# Patient Record
Sex: MALE | Race: BLACK OR AFRICAN AMERICAN | NOT HISPANIC OR LATINO
[De-identification: names, ages, dates, MRNs, and addresses within clinical notes are randomized per-mention and may not be internally consistent; named-entity substitution may affect disease eponyms.]

---

## 2018-02-26 PROBLEM — Z00.00 ENCOUNTER FOR PREVENTIVE HEALTH EXAMINATION: Status: ACTIVE | Noted: 2018-02-26

## 2018-03-05 ENCOUNTER — OTHER (OUTPATIENT)
Age: 63
End: 2018-03-05

## 2018-03-05 ENCOUNTER — APPOINTMENT (OUTPATIENT)
Dept: UROLOGY | Facility: CLINIC | Age: 63
End: 2018-03-05
Payer: COMMERCIAL

## 2018-03-05 VITALS
HEART RATE: 69 BPM | RESPIRATION RATE: 16 BRPM | BODY MASS INDEX: 20.89 KG/M2 | HEIGHT: 66 IN | WEIGHT: 130 LBS | DIASTOLIC BLOOD PRESSURE: 81 MMHG | TEMPERATURE: 97.9 F | SYSTOLIC BLOOD PRESSURE: 122 MMHG

## 2018-03-05 DIAGNOSIS — K42.9 UMBILICAL HERNIA W/OUT OBSTRUCTION OR GANGRENE: ICD-10-CM
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
## 2018-03-05 DIAGNOSIS — Z78.9 OTHER SPECIFIED HEALTH STATUS: ICD-10-CM

## 2018-03-05 DIAGNOSIS — E11.9 TYPE 2 DIABETES MELLITUS W/OUT COMPLICATIONS: ICD-10-CM

## 2018-03-05 DIAGNOSIS — Z02.82 ENCOUNTER FOR ADOPTION SERVICES: ICD-10-CM

## 2018-03-05 DIAGNOSIS — K40.90 UNILATERAL INGUINAL HERNIA, W/OUT OBSTRUCTION OR GANGRENE, NOT SPECIFIED AS RECURRENT: ICD-10-CM

## 2018-03-05 DIAGNOSIS — F17.200 NICOTINE DEPENDENCE, UNSPECIFIED, UNCOMPLICATED: ICD-10-CM

## 2018-03-05 PROCEDURE — 99204 OFFICE O/P NEW MOD 45 MIN: CPT

## 2018-03-05 RX ORDER — DAPAGLIFLOZIN AND METFORMIN HYDROCHLORIDE 2.5; 1 MG/1; MG/1
TABLET, FILM COATED, EXTENDED RELEASE ORAL
Refills: 0 | Status: ACTIVE | COMMUNITY

## 2018-03-06 PROBLEM — K42.9 PERIUMBILICAL HERNIA: Status: RESOLVED | Noted: 2018-03-06 | Resolved: 2018-03-06

## 2018-03-29 ENCOUNTER — OTHER (OUTPATIENT)
Age: 63
End: 2018-03-29

## 2018-05-02 ENCOUNTER — OUTPATIENT (OUTPATIENT)
Dept: OUTPATIENT SERVICES | Facility: HOSPITAL | Age: 63
LOS: 1 days | Discharge: HOME | End: 2018-05-02
Payer: COMMERCIAL

## 2018-05-02 VITALS
SYSTOLIC BLOOD PRESSURE: 137 MMHG | RESPIRATION RATE: 20 BRPM | TEMPERATURE: 98 F | OXYGEN SATURATION: 100 % | WEIGHT: 128.09 LBS | DIASTOLIC BLOOD PRESSURE: 88 MMHG | HEART RATE: 67 BPM | HEIGHT: 66 IN

## 2018-05-02 DIAGNOSIS — Z90.81 ACQUIRED ABSENCE OF SPLEEN: Chronic | ICD-10-CM

## 2018-05-02 DIAGNOSIS — C61 MALIGNANT NEOPLASM OF PROSTATE: ICD-10-CM

## 2018-05-02 DIAGNOSIS — Z98.890 OTHER SPECIFIED POSTPROCEDURAL STATES: Chronic | ICD-10-CM

## 2018-05-02 LAB
ABO RH CONFIRMATION: SIGNIFICANT CHANGE UP
ANION GAP SERPL CALC-SCNC: 8 MMOL/L — SIGNIFICANT CHANGE UP (ref 5–17)
ANISOCYTOSIS BLD QL: SLIGHT — SIGNIFICANT CHANGE UP
APPEARANCE UR: CLEAR — SIGNIFICANT CHANGE UP
APTT BLD: 37.1 SEC — SIGNIFICANT CHANGE UP (ref 27.5–37.4)
BACTERIA # UR AUTO: (no result)
BASOPHILS # BLD AUTO: 0.06 K/UL — SIGNIFICANT CHANGE UP (ref 0–0.2)
BASOPHILS NFR BLD AUTO: 1 % — SIGNIFICANT CHANGE UP (ref 0–2)
BILIRUB UR-MCNC: NEGATIVE — SIGNIFICANT CHANGE UP
BIZARRE PLATELETS BLD QL SMEAR: PRESENT — SIGNIFICANT CHANGE UP
BLD GP AB SCN SERPL QL: SIGNIFICANT CHANGE UP
BUN SERPL-MCNC: 22 MG/DL — SIGNIFICANT CHANGE UP (ref 7–23)
BURR CELLS BLD QL SMEAR: PRESENT — SIGNIFICANT CHANGE UP
CALCIUM SERPL-MCNC: 9.5 MG/DL — SIGNIFICANT CHANGE UP (ref 8.5–10.1)
CHLORIDE SERPL-SCNC: 108 MMOL/L — SIGNIFICANT CHANGE UP (ref 96–108)
CO2 SERPL-SCNC: 22 MMOL/L — SIGNIFICANT CHANGE UP (ref 22–31)
COLOR SPEC: YELLOW — SIGNIFICANT CHANGE UP
CREAT SERPL-MCNC: 0.9 MG/DL — SIGNIFICANT CHANGE UP (ref 0.5–1.3)
DIFF PNL FLD: (no result)
EOSINOPHIL # BLD AUTO: 0.23 K/UL — SIGNIFICANT CHANGE UP (ref 0–0.5)
EOSINOPHIL NFR BLD AUTO: 4 % — SIGNIFICANT CHANGE UP (ref 0–6)
EPI CELLS # UR: SIGNIFICANT CHANGE UP
GLUCOSE SERPL-MCNC: 110 MG/DL — HIGH (ref 70–99)
GLUCOSE UR QL: 1000 MG/DL
HCT VFR BLD CALC: 42.4 % — SIGNIFICANT CHANGE UP (ref 39–50)
HGB BLD-MCNC: 13.7 G/DL — SIGNIFICANT CHANGE UP (ref 13–17)
HYPOCHROMIA BLD QL: SLIGHT — SIGNIFICANT CHANGE UP
HYPOGRAN NEUTS BLD QL SMEAR: PRESENT — SIGNIFICANT CHANGE UP
INR BLD: 1.05 RATIO — SIGNIFICANT CHANGE UP (ref 0.88–1.16)
KETONES UR-MCNC: NEGATIVE — SIGNIFICANT CHANGE UP
LEUKOCYTE ESTERASE UR-ACNC: NEGATIVE — SIGNIFICANT CHANGE UP
LG PLATELETS BLD QL AUTO: SLIGHT — SIGNIFICANT CHANGE UP
LYMPHOCYTES # BLD AUTO: 2.11 K/UL — SIGNIFICANT CHANGE UP (ref 1–3.3)
LYMPHOCYTES # BLD AUTO: 37 % — SIGNIFICANT CHANGE UP (ref 13–44)
LYMPHOCYTES # SPEC AUTO: 1 % — HIGH (ref 0–0)
MANUAL SMEAR VERIFICATION: SIGNIFICANT CHANGE UP
MCHC RBC-ENTMCNC: 25.6 PG — LOW (ref 27–34)
MCHC RBC-ENTMCNC: 32.3 GM/DL — SIGNIFICANT CHANGE UP (ref 32–36)
MCV RBC AUTO: 79.1 FL — LOW (ref 80–100)
MONOCYTES # BLD AUTO: 0.57 K/UL — SIGNIFICANT CHANGE UP (ref 0–0.9)
MONOCYTES NFR BLD AUTO: 10 % — SIGNIFICANT CHANGE UP (ref 2–14)
NEUTROPHILS # BLD AUTO: 2.67 K/UL — SIGNIFICANT CHANGE UP (ref 1.8–7.4)
NEUTROPHILS NFR BLD AUTO: 47 % — SIGNIFICANT CHANGE UP (ref 43–77)
NITRITE UR-MCNC: NEGATIVE — SIGNIFICANT CHANGE UP
NRBC # BLD: 0 /100 — SIGNIFICANT CHANGE UP (ref 0–0)
NRBC # BLD: SIGNIFICANT CHANGE UP /100 WBCS (ref 0–0)
OVALOCYTES BLD QL SMEAR: SLIGHT — SIGNIFICANT CHANGE UP
PH UR: 7 — SIGNIFICANT CHANGE UP (ref 5–8)
PLAT MORPH BLD: NORMAL — SIGNIFICANT CHANGE UP
PLATELET # BLD AUTO: 226 K/UL — SIGNIFICANT CHANGE UP (ref 150–400)
PLATELET COUNT - ESTIMATE: NORMAL — SIGNIFICANT CHANGE UP
POIKILOCYTOSIS BLD QL AUTO: SIGNIFICANT CHANGE UP
POTASSIUM SERPL-MCNC: 4.3 MMOL/L — SIGNIFICANT CHANGE UP (ref 3.5–5.3)
POTASSIUM SERPL-SCNC: 4.3 MMOL/L — SIGNIFICANT CHANGE UP (ref 3.5–5.3)
PROT UR-MCNC: 15 MG/DL
PROTHROM AB SERPL-ACNC: 11.3 SEC — SIGNIFICANT CHANGE UP (ref 9.8–12.7)
RBC # BLD: 5.36 M/UL — SIGNIFICANT CHANGE UP (ref 4.2–5.8)
RBC # FLD: 21.8 % — HIGH (ref 10.3–14.5)
RBC BLD AUTO: (no result)
RBC CASTS # UR COMP ASSIST: (no result) /HPF (ref 0–4)
SCHISTOCYTES BLD QL AUTO: SLIGHT — SIGNIFICANT CHANGE UP
SODIUM SERPL-SCNC: 138 MMOL/L — SIGNIFICANT CHANGE UP (ref 135–145)
SP GR SPEC: 1.01 — SIGNIFICANT CHANGE UP (ref 1.01–1.02)
TARGETS BLD QL SMEAR: SLIGHT — SIGNIFICANT CHANGE UP
TYPE + AB SCN PNL BLD: SIGNIFICANT CHANGE UP
UROBILINOGEN FLD QL: NEGATIVE MG/DL — SIGNIFICANT CHANGE UP
WBC # BLD: 5.69 K/UL — SIGNIFICANT CHANGE UP (ref 3.8–10.5)
WBC # FLD AUTO: 5.69 K/UL — SIGNIFICANT CHANGE UP (ref 3.8–10.5)
WBC UR QL: SIGNIFICANT CHANGE UP

## 2018-05-02 PROCEDURE — 71046 X-RAY EXAM CHEST 2 VIEWS: CPT | Mod: 26

## 2018-05-02 PROCEDURE — 93010 ELECTROCARDIOGRAM REPORT: CPT

## 2018-05-02 NOTE — H&P PST ADULT - ASSESSMENT
64 y/o male with prostate cancer. Complain of urinary frequency and intermittent hematuria. Scheduled for Robotic radical prostatectomy with Dr. Victoria.    Plan  1. Stop all NSAIDS, herbal supplements and vitamins for 7 days.  2. NPO at midnight.  3. Use EZ sponges as directed
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
## 2018-05-02 NOTE — H&P PST ADULT - NSANTHOSAYNRD_GEN_A_CORE
No. KRYSTIN screening performed.  STOP BANG Legend: 0-2 = LOW Risk; 3-4 = INTERMEDIATE Risk; 5-8 = HIGH Risk

## 2018-05-08 RX ORDER — OXYCODONE HYDROCHLORIDE 5 MG/1
10 TABLET ORAL ONCE
Qty: 0 | Refills: 0 | Status: DISCONTINUED | OUTPATIENT
Start: 2018-05-09 | End: 2018-05-09

## 2018-05-08 RX ORDER — OXYCODONE HYDROCHLORIDE 5 MG/1
5 TABLET ORAL EVERY 4 HOURS
Qty: 0 | Refills: 0 | Status: DISCONTINUED | OUTPATIENT
Start: 2018-05-09 | End: 2018-05-09

## 2018-05-08 RX ORDER — FAMOTIDINE 10 MG/ML
20 INJECTION INTRAVENOUS ONCE
Qty: 0 | Refills: 0 | Status: COMPLETED | OUTPATIENT
Start: 2018-05-09 | End: 2018-05-09

## 2018-05-08 RX ORDER — ACETAMINOPHEN 500 MG
975 TABLET ORAL ONCE
Qty: 0 | Refills: 0 | Status: COMPLETED | OUTPATIENT
Start: 2018-05-09 | End: 2018-05-09

## 2018-05-08 RX ORDER — FENTANYL CITRATE 50 UG/ML
50 INJECTION INTRAVENOUS
Qty: 0 | Refills: 0 | Status: DISCONTINUED | OUTPATIENT
Start: 2018-05-09 | End: 2018-05-09

## 2018-05-09 ENCOUNTER — RESULT REVIEW (OUTPATIENT)
Age: 63
End: 2018-05-09

## 2018-05-09 ENCOUNTER — INPATIENT (INPATIENT)
Facility: HOSPITAL | Age: 63
LOS: 6 days | Discharge: HOME | End: 2018-05-16
Attending: UROLOGY | Admitting: UROLOGY
Payer: COMMERCIAL

## 2018-05-09 ENCOUNTER — APPOINTMENT (OUTPATIENT)
Dept: UROLOGY | Facility: HOSPITAL | Age: 63
End: 2018-05-09
Payer: COMMERCIAL

## 2018-05-09 VITALS
RESPIRATION RATE: 16 BRPM | HEART RATE: 62 BPM | OXYGEN SATURATION: 98 % | SYSTOLIC BLOOD PRESSURE: 132 MMHG | DIASTOLIC BLOOD PRESSURE: 92 MMHG | WEIGHT: 130.07 LBS | HEIGHT: 66 IN | TEMPERATURE: 98 F

## 2018-05-09 DIAGNOSIS — Z98.890 OTHER SPECIFIED POSTPROCEDURAL STATES: Chronic | ICD-10-CM

## 2018-05-09 DIAGNOSIS — Z90.81 ACQUIRED ABSENCE OF SPLEEN: Chronic | ICD-10-CM

## 2018-05-09 LAB
ANION GAP SERPL CALC-SCNC: 10 MMOL/L — SIGNIFICANT CHANGE UP (ref 5–17)
BLD GP AB SCN SERPL QL: SIGNIFICANT CHANGE UP
BUN SERPL-MCNC: 18 MG/DL — SIGNIFICANT CHANGE UP (ref 7–23)
CALCIUM SERPL-MCNC: 8.1 MG/DL — LOW (ref 8.5–10.1)
CHLORIDE SERPL-SCNC: 108 MMOL/L — SIGNIFICANT CHANGE UP (ref 96–108)
CO2 SERPL-SCNC: 22 MMOL/L — SIGNIFICANT CHANGE UP (ref 22–31)
CREAT SERPL-MCNC: 1.01 MG/DL — SIGNIFICANT CHANGE UP (ref 0.5–1.3)
GLUCOSE BLDC GLUCOMTR-MCNC: 108 MG/DL — HIGH (ref 70–99)
GLUCOSE BLDC GLUCOMTR-MCNC: 121 MG/DL — HIGH (ref 70–99)
GLUCOSE BLDC GLUCOMTR-MCNC: 94 MG/DL — SIGNIFICANT CHANGE UP (ref 70–99)
GLUCOSE SERPL-MCNC: 166 MG/DL — HIGH (ref 70–99)
HCT VFR BLD CALC: 43.7 % — SIGNIFICANT CHANGE UP (ref 39–50)
HGB BLD-MCNC: 14.1 G/DL — SIGNIFICANT CHANGE UP (ref 13–17)
MCHC RBC-ENTMCNC: 26 PG — LOW (ref 27–34)
MCHC RBC-ENTMCNC: 32.3 GM/DL — SIGNIFICANT CHANGE UP (ref 32–36)
MCV RBC AUTO: 80.6 FL — SIGNIFICANT CHANGE UP (ref 80–100)
NRBC # BLD: 0 /100 WBCS — SIGNIFICANT CHANGE UP (ref 0–0)
PLATELET # BLD AUTO: 205 K/UL — SIGNIFICANT CHANGE UP (ref 150–400)
POTASSIUM SERPL-MCNC: 3.8 MMOL/L — SIGNIFICANT CHANGE UP (ref 3.5–5.3)
POTASSIUM SERPL-SCNC: 3.8 MMOL/L — SIGNIFICANT CHANGE UP (ref 3.5–5.3)
RBC # BLD: 5.42 M/UL — SIGNIFICANT CHANGE UP (ref 4.2–5.8)
RBC # FLD: 20.8 % — HIGH (ref 10.3–14.5)
SODIUM SERPL-SCNC: 140 MMOL/L — SIGNIFICANT CHANGE UP (ref 135–145)
TYPE + AB SCN PNL BLD: SIGNIFICANT CHANGE UP
WBC # BLD: 14.52 K/UL — HIGH (ref 3.8–10.5)
WBC # FLD AUTO: 14.52 K/UL — HIGH (ref 3.8–10.5)

## 2018-05-09 PROCEDURE — 88300 SURGICAL PATH GROSS: CPT | Mod: 26,59

## 2018-05-09 PROCEDURE — 88307 TISSUE EXAM BY PATHOLOGIST: CPT | Mod: 26

## 2018-05-09 PROCEDURE — 55866 LAPS SURG PRST8ECT RPBIC RAD: CPT

## 2018-05-09 PROCEDURE — 38571 LAPAROSCOPY LYMPHADENECTOMY: CPT

## 2018-05-09 PROCEDURE — 88309 TISSUE EXAM BY PATHOLOGIST: CPT | Mod: 26

## 2018-05-09 RX ORDER — INSULIN LISPRO 100/ML
VIAL (ML) SUBCUTANEOUS
Qty: 0 | Refills: 0 | Status: DISCONTINUED | OUTPATIENT
Start: 2018-05-09 | End: 2018-05-16

## 2018-05-09 RX ORDER — DEXTROSE 50 % IN WATER 50 %
25 SYRINGE (ML) INTRAVENOUS ONCE
Qty: 0 | Refills: 0 | Status: DISCONTINUED | OUTPATIENT
Start: 2018-05-09 | End: 2018-05-16

## 2018-05-09 RX ORDER — HEPARIN SODIUM 5000 [USP'U]/ML
5000 INJECTION INTRAVENOUS; SUBCUTANEOUS EVERY 8 HOURS
Qty: 0 | Refills: 0 | Status: DISCONTINUED | OUTPATIENT
Start: 2018-05-09 | End: 2018-05-11

## 2018-05-09 RX ORDER — FERROUS GLUCONATE 100 %
1 POWDER (GRAM) MISCELLANEOUS
Qty: 0 | Refills: 0 | COMMUNITY

## 2018-05-09 RX ORDER — DOCUSATE SODIUM 100 MG
100 CAPSULE ORAL THREE TIMES A DAY
Qty: 0 | Refills: 0 | Status: DISCONTINUED | OUTPATIENT
Start: 2018-05-09 | End: 2018-05-16

## 2018-05-09 RX ORDER — SODIUM CHLORIDE 9 MG/ML
1000 INJECTION, SOLUTION INTRAVENOUS
Qty: 0 | Refills: 0 | Status: DISCONTINUED | OUTPATIENT
Start: 2018-05-09 | End: 2018-05-11

## 2018-05-09 RX ORDER — OXYBUTYNIN CHLORIDE 5 MG
5 TABLET ORAL ONCE
Qty: 0 | Refills: 0 | Status: COMPLETED | OUTPATIENT
Start: 2018-05-09 | End: 2018-05-09

## 2018-05-09 RX ORDER — DEXTROSE 50 % IN WATER 50 %
15 SYRINGE (ML) INTRAVENOUS ONCE
Qty: 0 | Refills: 0 | Status: DISCONTINUED | OUTPATIENT
Start: 2018-05-09 | End: 2018-05-16

## 2018-05-09 RX ORDER — MORPHINE SULFATE 50 MG/1
2 CAPSULE, EXTENDED RELEASE ORAL
Qty: 0 | Refills: 0 | Status: DISCONTINUED | OUTPATIENT
Start: 2018-05-09 | End: 2018-05-11

## 2018-05-09 RX ORDER — ONDANSETRON 8 MG/1
4 TABLET, FILM COATED ORAL EVERY 6 HOURS
Qty: 0 | Refills: 0 | Status: DISCONTINUED | OUTPATIENT
Start: 2018-05-09 | End: 2018-05-16

## 2018-05-09 RX ORDER — SENNA PLUS 8.6 MG/1
2 TABLET ORAL AT BEDTIME
Qty: 0 | Refills: 0 | Status: DISCONTINUED | OUTPATIENT
Start: 2018-05-09 | End: 2018-05-16

## 2018-05-09 RX ORDER — ONDANSETRON 8 MG/1
4 TABLET, FILM COATED ORAL EVERY 6 HOURS
Qty: 0 | Refills: 0 | Status: DISCONTINUED | OUTPATIENT
Start: 2018-05-09 | End: 2018-05-09

## 2018-05-09 RX ORDER — GLUCAGON INJECTION, SOLUTION 0.5 MG/.1ML
1 INJECTION, SOLUTION SUBCUTANEOUS ONCE
Qty: 0 | Refills: 0 | Status: DISCONTINUED | OUTPATIENT
Start: 2018-05-09 | End: 2018-05-16

## 2018-05-09 RX ORDER — DEXTROSE 50 % IN WATER 50 %
12.5 SYRINGE (ML) INTRAVENOUS ONCE
Qty: 0 | Refills: 0 | Status: DISCONTINUED | OUTPATIENT
Start: 2018-05-09 | End: 2018-05-16

## 2018-05-09 RX ORDER — CEFAZOLIN SODIUM 1 G
2000 VIAL (EA) INJECTION EVERY 8 HOURS
Qty: 0 | Refills: 0 | Status: COMPLETED | OUTPATIENT
Start: 2018-05-09 | End: 2018-05-09

## 2018-05-09 RX ORDER — SODIUM CHLORIDE 9 MG/ML
1000 INJECTION, SOLUTION INTRAVENOUS
Qty: 0 | Refills: 0 | Status: DISCONTINUED | OUTPATIENT
Start: 2018-05-09 | End: 2018-05-09

## 2018-05-09 RX ORDER — SODIUM CHLORIDE 9 MG/ML
1000 INJECTION, SOLUTION INTRAVENOUS
Qty: 0 | Refills: 0 | Status: DISCONTINUED | OUTPATIENT
Start: 2018-05-09 | End: 2018-05-16

## 2018-05-09 RX ORDER — OXYCODONE AND ACETAMINOPHEN 5; 325 MG/1; MG/1
2 TABLET ORAL EVERY 6 HOURS
Qty: 0 | Refills: 0 | Status: DISCONTINUED | OUTPATIENT
Start: 2018-05-09 | End: 2018-05-11

## 2018-05-09 RX ORDER — DAPAGLIFLOZIN AND METFORMIN HYDROCHLORIDE 10; 1000 MG/1; MG/1
1 TABLET, FILM COATED, EXTENDED RELEASE ORAL
Qty: 0 | Refills: 0 | COMMUNITY

## 2018-05-09 RX ADMIN — FENTANYL CITRATE 50 MICROGRAM(S): 50 INJECTION INTRAVENOUS at 12:41

## 2018-05-09 RX ADMIN — FENTANYL CITRATE 50 MICROGRAM(S): 50 INJECTION INTRAVENOUS at 12:24

## 2018-05-09 RX ADMIN — Medication 975 MILLIGRAM(S): at 06:49

## 2018-05-09 RX ADMIN — OXYCODONE HYDROCHLORIDE 10 MILLIGRAM(S): 5 TABLET ORAL at 06:50

## 2018-05-09 RX ADMIN — FENTANYL CITRATE 50 MICROGRAM(S): 50 INJECTION INTRAVENOUS at 13:22

## 2018-05-09 RX ADMIN — SODIUM CHLORIDE 125 MILLILITER(S): 9 INJECTION, SOLUTION INTRAVENOUS at 22:34

## 2018-05-09 RX ADMIN — Medication 100 MILLIGRAM(S): at 23:09

## 2018-05-09 RX ADMIN — FENTANYL CITRATE 50 MICROGRAM(S): 50 INJECTION INTRAVENOUS at 12:07

## 2018-05-09 RX ADMIN — FAMOTIDINE 20 MILLIGRAM(S): 10 INJECTION INTRAVENOUS at 06:50

## 2018-05-09 RX ADMIN — FENTANYL CITRATE 50 MICROGRAM(S): 50 INJECTION INTRAVENOUS at 12:40

## 2018-05-09 RX ADMIN — FENTANYL CITRATE 50 MICROGRAM(S): 50 INJECTION INTRAVENOUS at 12:34

## 2018-05-09 RX ADMIN — Medication 100 MILLIGRAM(S): at 22:17

## 2018-05-09 RX ADMIN — Medication 100 MILLIGRAM(S): at 15:41

## 2018-05-09 RX ADMIN — OXYCODONE HYDROCHLORIDE 5 MILLIGRAM(S): 5 TABLET ORAL at 12:33

## 2018-05-09 RX ADMIN — Medication 5 MILLIGRAM(S): at 16:01

## 2018-05-09 RX ADMIN — SENNA PLUS 2 TABLET(S): 8.6 TABLET ORAL at 22:17

## 2018-05-09 RX ADMIN — OXYCODONE AND ACETAMINOPHEN 2 TABLET(S): 5; 325 TABLET ORAL at 15:58

## 2018-05-09 RX ADMIN — OXYCODONE HYDROCHLORIDE 5 MILLIGRAM(S): 5 TABLET ORAL at 12:41

## 2018-05-09 RX ADMIN — HEPARIN SODIUM 5000 UNIT(S): 5000 INJECTION INTRAVENOUS; SUBCUTANEOUS at 22:18

## 2018-05-09 RX ADMIN — Medication 975 MILLIGRAM(S): at 06:50

## 2018-05-09 NOTE — PROGRESS NOTE ADULT - ASSESSMENT
A/P:  62 yo male s/p robotic prostatectomy with some bladder spasms post-op.   -Ditropan x1 dose for bladder spasms  -Monitor U/O and RENATE drain OP  -Heparin/SCDs for dvt ppx  -Abx- Ancef x 2 doses post-op  -Follow up AM labs  -Clears, likely ADAT in AM.
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
## 2018-05-09 NOTE — BRIEF OPERATIVE NOTE - PROCEDURE
<<-----Click on this checkbox to enter Procedure Prostatectomy, radical, laparoscopic, robot-assisted  05/09/2018    Active  [id number]

## 2018-05-09 NOTE — PATIENT PROFILE ADULT. - TEACHING/LEARNING DEVELOPMENTAL CONSIDERATIONS
Patient needs things explained thoroughly. Not understanding some basic language./developmental considerations

## 2018-05-09 NOTE — PROGRESS NOTE ADULT - SUBJECTIVE AND OBJECTIVE BOX
Pt complaining of bladder spasms.  Tolerating some water.  No n/v.      Vital Signs Last 24 Hrs-  T(F): 97.6    HR: 74  BP: 124/93   RR: 18   SpO2: 100%     PHYSICAL EXAM:    Gen- comfortable  CV- RRR  Chest- CTA bilat  Abd- soft NTND, incisions intact with dermabond, hypoactive BS  Ext- no edema  RENATE-100 cc serosang/4 hours since OR  Mayen-340 pink tinged urine/4 hours since OR

## 2018-05-10 LAB
ANION GAP SERPL CALC-SCNC: 8 MMOL/L — SIGNIFICANT CHANGE UP (ref 5–17)
BASOPHILS # BLD AUTO: 0.02 K/UL — SIGNIFICANT CHANGE UP (ref 0–0.2)
BASOPHILS NFR BLD AUTO: 0.2 % — SIGNIFICANT CHANGE UP (ref 0–2)
BUN SERPL-MCNC: 10 MG/DL — SIGNIFICANT CHANGE UP (ref 7–23)
CALCIUM SERPL-MCNC: 8.5 MG/DL — SIGNIFICANT CHANGE UP (ref 8.5–10.1)
CHLORIDE SERPL-SCNC: 106 MMOL/L — SIGNIFICANT CHANGE UP (ref 96–108)
CO2 SERPL-SCNC: 26 MMOL/L — SIGNIFICANT CHANGE UP (ref 22–31)
CREAT SERPL-MCNC: 0.92 MG/DL — SIGNIFICANT CHANGE UP (ref 0.5–1.3)
EOSINOPHIL # BLD AUTO: 0.01 K/UL — SIGNIFICANT CHANGE UP (ref 0–0.5)
EOSINOPHIL NFR BLD AUTO: 0.1 % — SIGNIFICANT CHANGE UP (ref 0–6)
GLUCOSE BLDC GLUCOMTR-MCNC: 103 MG/DL — HIGH (ref 70–99)
GLUCOSE BLDC GLUCOMTR-MCNC: 90 MG/DL — SIGNIFICANT CHANGE UP (ref 70–99)
GLUCOSE BLDC GLUCOMTR-MCNC: 94 MG/DL — SIGNIFICANT CHANGE UP (ref 70–99)
GLUCOSE SERPL-MCNC: 97 MG/DL — SIGNIFICANT CHANGE UP (ref 70–99)
HBA1C BLD-MCNC: 6.1 % — HIGH (ref 4–5.6)
HCT VFR BLD CALC: 38.9 % — LOW (ref 39–50)
HCT VFR BLD CALC: 40.4 % — SIGNIFICANT CHANGE UP (ref 39–50)
HCT VFR BLD CALC: 42 % — SIGNIFICANT CHANGE UP (ref 39–50)
HGB BLD-MCNC: 12.6 G/DL — LOW (ref 13–17)
HGB BLD-MCNC: 13 G/DL — SIGNIFICANT CHANGE UP (ref 13–17)
HGB BLD-MCNC: 13.4 G/DL — SIGNIFICANT CHANGE UP (ref 13–17)
IMM GRANULOCYTES NFR BLD AUTO: 0.2 % — SIGNIFICANT CHANGE UP (ref 0–1.5)
LYMPHOCYTES # BLD AUTO: 1.58 K/UL — SIGNIFICANT CHANGE UP (ref 1–3.3)
LYMPHOCYTES # BLD AUTO: 16.5 % — SIGNIFICANT CHANGE UP (ref 13–44)
MCHC RBC-ENTMCNC: 25.3 PG — LOW (ref 27–34)
MCHC RBC-ENTMCNC: 25.8 PG — LOW (ref 27–34)
MCHC RBC-ENTMCNC: 32.2 GM/DL — SIGNIFICANT CHANGE UP (ref 32–36)
MCHC RBC-ENTMCNC: 32.4 GM/DL — SIGNIFICANT CHANGE UP (ref 32–36)
MCV RBC AUTO: 78.8 FL — LOW (ref 80–100)
MCV RBC AUTO: 79.7 FL — LOW (ref 80–100)
MONOCYTES # BLD AUTO: 1.3 K/UL — HIGH (ref 0–0.9)
MONOCYTES NFR BLD AUTO: 13.6 % — SIGNIFICANT CHANGE UP (ref 2–14)
NEUTROPHILS # BLD AUTO: 6.62 K/UL — SIGNIFICANT CHANGE UP (ref 1.8–7.4)
NEUTROPHILS NFR BLD AUTO: 69.4 % — SIGNIFICANT CHANGE UP (ref 43–77)
NRBC # BLD: 0 /100 WBCS — SIGNIFICANT CHANGE UP (ref 0–0)
NRBC # BLD: 0 /100 WBCS — SIGNIFICANT CHANGE UP (ref 0–0)
PLATELET # BLD AUTO: 179 K/UL — SIGNIFICANT CHANGE UP (ref 150–400)
PLATELET # BLD AUTO: 203 K/UL — SIGNIFICANT CHANGE UP (ref 150–400)
POTASSIUM SERPL-MCNC: 3.8 MMOL/L — SIGNIFICANT CHANGE UP (ref 3.5–5.3)
POTASSIUM SERPL-SCNC: 3.8 MMOL/L — SIGNIFICANT CHANGE UP (ref 3.5–5.3)
RBC # BLD: 4.88 M/UL — SIGNIFICANT CHANGE UP (ref 4.2–5.8)
RBC # BLD: 5.13 M/UL — SIGNIFICANT CHANGE UP (ref 4.2–5.8)
RBC # FLD: 20.5 % — HIGH (ref 10.3–14.5)
RBC # FLD: 21 % — HIGH (ref 10.3–14.5)
SODIUM SERPL-SCNC: 140 MMOL/L — SIGNIFICANT CHANGE UP (ref 135–145)
WBC # BLD: 10.05 K/UL — SIGNIFICANT CHANGE UP (ref 3.8–10.5)
WBC # BLD: 9.55 K/UL — SIGNIFICANT CHANGE UP (ref 3.8–10.5)
WBC # FLD AUTO: 10.05 K/UL — SIGNIFICANT CHANGE UP (ref 3.8–10.5)
WBC # FLD AUTO: 9.55 K/UL — SIGNIFICANT CHANGE UP (ref 3.8–10.5)

## 2018-05-10 RX ORDER — ACETAMINOPHEN 500 MG
650 TABLET ORAL EVERY 6 HOURS
Qty: 0 | Refills: 0 | Status: DISCONTINUED | OUTPATIENT
Start: 2018-05-10 | End: 2018-05-16

## 2018-05-10 RX ADMIN — OXYCODONE AND ACETAMINOPHEN 2 TABLET(S): 5; 325 TABLET ORAL at 13:05

## 2018-05-10 RX ADMIN — Medication 100 MILLIGRAM(S): at 22:31

## 2018-05-10 RX ADMIN — SENNA PLUS 2 TABLET(S): 8.6 TABLET ORAL at 22:31

## 2018-05-10 RX ADMIN — HEPARIN SODIUM 5000 UNIT(S): 5000 INJECTION INTRAVENOUS; SUBCUTANEOUS at 05:21

## 2018-05-10 RX ADMIN — OXYCODONE AND ACETAMINOPHEN 2 TABLET(S): 5; 325 TABLET ORAL at 19:54

## 2018-05-10 RX ADMIN — Medication 1 TABLET(S): at 12:40

## 2018-05-10 RX ADMIN — Medication 100 MILLIGRAM(S): at 05:21

## 2018-05-10 RX ADMIN — Medication 100 MILLIGRAM(S): at 15:09

## 2018-05-10 RX ADMIN — OXYCODONE AND ACETAMINOPHEN 2 TABLET(S): 5; 325 TABLET ORAL at 12:07

## 2018-05-10 RX ADMIN — OXYCODONE AND ACETAMINOPHEN 2 TABLET(S): 5; 325 TABLET ORAL at 05:19

## 2018-05-10 NOTE — PROGRESS NOTE ADULT - SUBJECTIVE AND OBJECTIVE BOX
Asked to evaluate patient's RENATE drain site due to dressing being saturated.  Patient sitting in chair, offers no complaints at this time.      BP- 118/78, HR- 98    Abdomen: Non-distended, wounds C/D/I with Dermabond in place.  RENATE drain dressing saturated.  Dressing removed.  + slow rate of bleeding noted from lateral aspect of RENATE drain site.  Pressure held x 5 minutes-- bleeding slowed a bit but hemostasis not achieved.  RENATE bulb with sanguinous output.  Abdomen soft, minimally tender to lower aspect.  No rebound, no guarding.  Surgicel placed around RENATE site and pressure dressing placed.    RENATE output= 500ml x last shift    A/P: POD # 1 s/p Robotic Prostatectomy with post operative bleeding    -- D/W Dr. Victoria  -- STAT CBC

## 2018-05-10 NOTE — PROGRESS NOTE ADULT - SUBJECTIVE AND OBJECTIVE BOX
S/P Robotic Prostatectomy POD#1    Pt RLQ RENATE drain site bandage saturated 3X today. RENATE output last 2 hrs 90cc SS recorded. Advised nurse to hold Heparin SQ 2PM dose for high RENATE output. F/U H/H 4PM and Re-eval RENATE output more freq.

## 2018-05-10 NOTE — PROGRESS NOTE ADULT - ASSESSMENT
64 y/o M s/p Robotic Prostatectomy POD#1  - Adv Diet  - OOB/Ambulate as desiree  -f/u RENATE OP  - Changed Dressing  -Re-eval later today for d/c planning  Dr. Victoria Aware

## 2018-05-10 NOTE — PROGRESS NOTE ADULT - SUBJECTIVE AND OBJECTIVE BOX
Status Post:      Post Operative Day #: 1    SUBJECTIVE: Pt seen and examined at bedside. No complaints. No events overnight. Tolerating Clears.     Flatus: Y             Bowel Movement: N  Pain (0-10): 3          Pain Control Adequate: Y  Nausea: N          Vomiting: N  Diarrhea/Constipation?  N  Chest Pain: N   SOB:  N    MEDICATIONS  (STANDING):  calcium carbonate 1250 mG + Vitamin D (OsCal 500 + D) 1 Tablet(s) Oral daily  dextrose 5%. 1000 milliLiter(s) (50 mL/Hr) IV Continuous <Continuous>  dextrose 50% Injectable 12.5 Gram(s) IV Push once  dextrose 50% Injectable 25 Gram(s) IV Push once  dextrose 50% Injectable 25 Gram(s) IV Push once  docusate sodium 100 milliGRAM(s) Oral three times a day  heparin  Injectable 5000 Unit(s) SubCutaneous every 8 hours  insulin lispro (HumaLOG) corrective regimen sliding scale   SubCutaneous three times a day before meals  lactated ringers. 1000 milliLiter(s) (125 mL/Hr) IV Continuous <Continuous>  senna 2 Tablet(s) Oral at bedtime    MEDICATIONS  (PRN):  dextrose 40% Gel 15 Gram(s) Oral once PRN Blood Glucose LESS THAN 70 milliGRAM(s)/deciliter  glucagon  Injectable 1 milliGRAM(s) IntraMuscular once PRN Glucose LESS THAN 70 milligrams/deciliter  morphine  - Injectable 2 milliGRAM(s) IV Push every 3 hours PRN Severe Pain (7 - 10)  ondansetron Injectable 4 milliGRAM(s) IV Push every 6 hours PRN Nausea and/or Vomiting  oxyCODONE    5 mG/acetaminophen 325 mG 2 Tablet(s) Oral every 6 hours PRN Severe Pain      OBJECTIVE:  Vital Signs Last 24 Hrs  T(C): 37.6 (10 May 2018 04:59), Max: 37.6 (10 May 2018 04:59)  T(F): 99.6 (10 May 2018 04:59), Max: 99.6 (10 May 2018 04:59)  HR: 83 (10 May 2018 04:59) (74 - 98)  BP: 120/83 (10 May 2018 04:59) (101/69 - 125/85)  BP(mean): --  RR: 18 (10 May 2018 04:59) (12 - 24)  SpO2: 97% (10 May 2018 04:59) (96% - 100%)  I&O's Detail    09 May 2018 07:01  -  10 May 2018 07:00  --------------------------------------------------------  IN:    IV PiggyBack: 50 mL    lactated ringers.: 1415 mL    Other: 2000 mL  Total IN: 3465 mL    OUT:    Bulb: 285 mL    Indwelling Catheter - Urethral: 1525 mL    Other: 325 mL  Total OUT: 2135 mL    Total NET: 1330 mL      10 May 2018 07:01  -  10 May 2018 09:15  --------------------------------------------------------  IN:  Total IN: 0 mL    OUT:    Bulb: 100 mL  Total OUT: 100 mL    Total NET: -100 mL    Labs:  CBC Full  -  ( 10 May 2018 05:25 )  WBC Count : 9.55 K/uL  Hemoglobin : 13.0 g/dL  Hematocrit : 40.4 %  Platelet Count - Automated : 203 K/uL    CBC Full  -  ( 09 May 2018 12:21 )  WBC Count : 14.52 K/uL  Hemoglobin : 14.1 g/dL  Hematocrit : 43.7 %  Platelet Count - Automated : 205 K/uL    10 May 2018 05:25    140    |  106    |  10     ----------------------------<  97     3.8     |  26     |  0.92   09 May 2018 12:21    140    |  108    |  18     ----------------------------<  166    3.8     |  22     |  1.01     Ca    8.5        10 May 2018 05:25  Ca    8.1        09 May 2018 12:21        PHYSICAL EXAM:   Chest: CTABL  CV: S1, S2 Present  Abd: Soft, mild tender, ND, +BS, Incision Site C/D/I with derma bond. RENATE site soiled.   : Aguiar in place  Musculoskeletal: No Edema

## 2018-05-10 NOTE — PROGRESS NOTE ADULT - SUBJECTIVE AND OBJECTIVE BOX
POD #1 from RALP  feels well  AVSS  increased Renate output   last shift since this  cc  HCT this am was 40.4  abd: mild distension, NT +BS  ext 0C/C/E  plan:   Observe HCT repeat  the RENATE drain site is oozing and we resutured it.

## 2018-05-11 LAB
ANION GAP SERPL CALC-SCNC: 7 MMOL/L — SIGNIFICANT CHANGE UP (ref 5–17)
BUN SERPL-MCNC: 10 MG/DL — SIGNIFICANT CHANGE UP (ref 7–23)
CALCIUM SERPL-MCNC: 8.5 MG/DL — SIGNIFICANT CHANGE UP (ref 8.5–10.1)
CHLORIDE SERPL-SCNC: 105 MMOL/L — SIGNIFICANT CHANGE UP (ref 96–108)
CO2 SERPL-SCNC: 27 MMOL/L — SIGNIFICANT CHANGE UP (ref 22–31)
CREAT SERPL-MCNC: 0.82 MG/DL — SIGNIFICANT CHANGE UP (ref 0.5–1.3)
GLUCOSE BLDC GLUCOMTR-MCNC: 105 MG/DL — HIGH (ref 70–99)
GLUCOSE BLDC GLUCOMTR-MCNC: 118 MG/DL — HIGH (ref 70–99)
GLUCOSE BLDC GLUCOMTR-MCNC: 147 MG/DL — HIGH (ref 70–99)
GLUCOSE BLDC GLUCOMTR-MCNC: 171 MG/DL — HIGH (ref 70–99)
GLUCOSE BLDC GLUCOMTR-MCNC: 99 MG/DL — SIGNIFICANT CHANGE UP (ref 70–99)
GLUCOSE SERPL-MCNC: 162 MG/DL — HIGH (ref 70–99)
HCT VFR BLD CALC: 32.6 % — LOW (ref 39–50)
HCT VFR BLD CALC: 36 % — LOW (ref 39–50)
HCT VFR BLD CALC: 36.1 % — LOW (ref 39–50)
HGB BLD-MCNC: 10.7 G/DL — LOW (ref 13–17)
HGB BLD-MCNC: 11.6 G/DL — LOW (ref 13–17)
HGB BLD-MCNC: 11.6 G/DL — LOW (ref 13–17)
MCHC RBC-ENTMCNC: 25.4 PG — LOW (ref 27–34)
MCHC RBC-ENTMCNC: 26.3 PG — LOW (ref 27–34)
MCHC RBC-ENTMCNC: 32.1 GM/DL — SIGNIFICANT CHANGE UP (ref 32–36)
MCHC RBC-ENTMCNC: 32.2 GM/DL — SIGNIFICANT CHANGE UP (ref 32–36)
MCV RBC AUTO: 79.2 FL — LOW (ref 80–100)
MCV RBC AUTO: 81.6 FL — SIGNIFICANT CHANGE UP (ref 80–100)
NRBC # BLD: 0 /100 WBCS — SIGNIFICANT CHANGE UP (ref 0–0)
NRBC # BLD: 0 /100 WBCS — SIGNIFICANT CHANGE UP (ref 0–0)
PLATELET # BLD AUTO: 160 K/UL — SIGNIFICANT CHANGE UP (ref 150–400)
PLATELET # BLD AUTO: 180 K/UL — SIGNIFICANT CHANGE UP (ref 150–400)
POTASSIUM SERPL-MCNC: 4.2 MMOL/L — SIGNIFICANT CHANGE UP (ref 3.5–5.3)
POTASSIUM SERPL-SCNC: 4.2 MMOL/L — SIGNIFICANT CHANGE UP (ref 3.5–5.3)
RBC # BLD: 4.41 M/UL — SIGNIFICANT CHANGE UP (ref 4.2–5.8)
RBC # BLD: 4.56 M/UL — SIGNIFICANT CHANGE UP (ref 4.2–5.8)
RBC # FLD: 20.2 % — HIGH (ref 10.3–14.5)
RBC # FLD: 20.3 % — HIGH (ref 10.3–14.5)
SODIUM SERPL-SCNC: 139 MMOL/L — SIGNIFICANT CHANGE UP (ref 135–145)
WBC # BLD: 10.2 K/UL — SIGNIFICANT CHANGE UP (ref 3.8–10.5)
WBC # BLD: 14.03 K/UL — HIGH (ref 3.8–10.5)
WBC # FLD AUTO: 10.2 K/UL — SIGNIFICANT CHANGE UP (ref 3.8–10.5)
WBC # FLD AUTO: 14.03 K/UL — HIGH (ref 3.8–10.5)

## 2018-05-11 PROCEDURE — 75736 ARTERY X-RAYS PELVIS: CPT | Mod: 26

## 2018-05-11 PROCEDURE — 36246 INS CATH ABD/L-EXT ART 2ND: CPT

## 2018-05-11 PROCEDURE — 74177 CT ABD & PELVIS W/CONTRAST: CPT | Mod: 26

## 2018-05-11 PROCEDURE — 75710 ARTERY X-RAYS ARM/LEG: CPT | Mod: 26,LT

## 2018-05-11 RX ORDER — DEXTROSE MONOHYDRATE, SODIUM CHLORIDE, AND POTASSIUM CHLORIDE 50; .745; 4.5 G/1000ML; G/1000ML; G/1000ML
1000 INJECTION, SOLUTION INTRAVENOUS
Qty: 0 | Refills: 0 | Status: DISCONTINUED | OUTPATIENT
Start: 2018-05-11 | End: 2018-05-12

## 2018-05-11 RX ORDER — OXYCODONE HYDROCHLORIDE 5 MG/1
5 TABLET ORAL EVERY 4 HOURS
Qty: 0 | Refills: 0 | Status: DISCONTINUED | OUTPATIENT
Start: 2018-05-11 | End: 2018-05-11

## 2018-05-11 RX ORDER — FENTANYL CITRATE 50 UG/ML
50 INJECTION INTRAVENOUS
Qty: 0 | Refills: 0 | Status: DISCONTINUED | OUTPATIENT
Start: 2018-05-11 | End: 2018-05-11

## 2018-05-11 RX ORDER — ONDANSETRON 8 MG/1
4 TABLET, FILM COATED ORAL ONCE
Qty: 0 | Refills: 0 | Status: DISCONTINUED | OUTPATIENT
Start: 2018-05-11 | End: 2018-05-11

## 2018-05-11 RX ORDER — SODIUM CHLORIDE 9 MG/ML
1000 INJECTION INTRAMUSCULAR; INTRAVENOUS; SUBCUTANEOUS
Qty: 0 | Refills: 0 | Status: DISCONTINUED | OUTPATIENT
Start: 2018-05-11 | End: 2018-05-11

## 2018-05-11 RX ADMIN — DEXTROSE MONOHYDRATE, SODIUM CHLORIDE, AND POTASSIUM CHLORIDE 75 MILLILITER(S): 50; .745; 4.5 INJECTION, SOLUTION INTRAVENOUS at 17:17

## 2018-05-11 RX ADMIN — DEXTROSE MONOHYDRATE, SODIUM CHLORIDE, AND POTASSIUM CHLORIDE 75 MILLILITER(S): 50; .745; 4.5 INJECTION, SOLUTION INTRAVENOUS at 02:29

## 2018-05-11 RX ADMIN — MORPHINE SULFATE 2 MILLIGRAM(S): 50 CAPSULE, EXTENDED RELEASE ORAL at 06:20

## 2018-05-11 RX ADMIN — Medication 100 MILLIGRAM(S): at 05:12

## 2018-05-11 RX ADMIN — SODIUM CHLORIDE 75 MILLILITER(S): 9 INJECTION INTRAMUSCULAR; INTRAVENOUS; SUBCUTANEOUS at 11:57

## 2018-05-11 RX ADMIN — Medication 100 MILLIGRAM(S): at 21:57

## 2018-05-11 RX ADMIN — Medication 650 MILLIGRAM(S): at 18:26

## 2018-05-11 RX ADMIN — Medication 100 MILLIGRAM(S): at 13:58

## 2018-05-11 RX ADMIN — Medication 1: at 08:38

## 2018-05-11 RX ADMIN — SENNA PLUS 2 TABLET(S): 8.6 TABLET ORAL at 21:57

## 2018-05-11 RX ADMIN — Medication 1 TABLET(S): at 13:58

## 2018-05-11 RX ADMIN — OXYCODONE AND ACETAMINOPHEN 2 TABLET(S): 5; 325 TABLET ORAL at 04:01

## 2018-05-11 NOTE — PROGRESS NOTE ADULT - ASSESSMENT
A/P:  62 yo male POD#2 s/p robotic prostatectomy with possible L ext iliac artery leak  -Vitals have remained stable  -IR consulted this AM  -Maintain pressure dressing RENATE site A/P:  62 yo male POD#2 s/p robotic prostatectomy with possible L ext iliac artery leak, vitals stable.  -IR consulted this AM, will do pelvic angiogram   -Maintain pressure dressing RENATE site  -Continue npo  -Hold heparin

## 2018-05-11 NOTE — BRIEF OPERATIVE NOTE - PRE-OP DX
Blood loss anemia  05/11/2018  Possible pelvic source, necessitating IR angiography  Active  Timbo Quintanilla  Prostate cancer  05/09/2018    Active  Mehnaz Rousseau

## 2018-05-11 NOTE — PROGRESS NOTE ADULT - SUBJECTIVE AND OBJECTIVE BOX
Pt seen and examined after angiogram.  Pt still has no complaints.     Vital Signs Last 24 Hrs  T(F): 98.5  HR: 89   BP: 116/80   SpO2: 100%     Abs- soft, NT, still distended however unchanged from this AM, hypoactive BS  R groin site dressing dry, no swelling  RENATE drain-90cc over 2 hours however more serous now  Urine output remains good                          11.6   14.03 )-----------( 160      ( 11 May 2018 12:28 )             36.0       Discussed above with Dr. Victoria.  -Continue to monitor RENATE output.    -Can start clears.

## 2018-05-11 NOTE — CHART NOTE - NSCHARTNOTEFT_GEN_A_CORE
Pt seen and examined after hb/hct tonight dropped from 11.6/36 to 10.7/32.6.  Pt without complaints, resting comfortably.    ICU Vital Signs Last 24 Hrs  T(C): 37.4 (11 May 2018 20:42), Max: 37.4 (11 May 2018 20:42)  T(F): 99.4 (11 May 2018 20:42), Max: 99.4 (11 May 2018 20:42)  HR: 94 (11 May 2018 20:42) (85 - 101)  BP: 119/75 (11 May 2018 20:42) (97/79 - 130/80)  BP(mean): 82 (11 May 2018 09:50) (82 - 82)  ABP: --  ABP(mean): --  RR: 17 (11 May 2018 20:42) (16 - 27)  SpO2: 98% (11 May 2018 20:42) (97% - 100%)    anoop: 295cc for day shift, 45cc in last 2 hours serous      PE: ABD: soft, distended  , nontender, drain dressing 1/2 saturated with blood.  ANOOP with serous d/c      A/p s/p robotic prostatectomy on 5/9 with decreasing hb/hct but stable vitals  - will check am cbc if hb goes below will consider transfusing,  pt most likely equilibrating      Discussed with Dr. Dunn

## 2018-05-11 NOTE — PROGRESS NOTE ADULT - SUBJECTIVE AND OBJECTIVE BOX
POD #2 from RALP  Feels well  AVSS  Excellent UO  RENATE has high sanguinous output  Abd: soft, NT distended and still oozing aroung the RENATE  Ext: 0C/C/E  Hct 36  Hgb 12.6 decreased to 11.6  Creatinine: 0.82    CT reveals an arterial blush around the region of the left external iliac artery.  He has a hematoma and contrast extravasation in that area.    Discussed with IR    Plan is for IR with possible embolization vs stenting  Discussed in detail with the patient.   He agrees and all questions are answered.

## 2018-05-11 NOTE — BRIEF OPERATIVE NOTE - OPERATION/FINDINGS
1) Pelvic, selective L external iliac, and selective L internal iliac angiography shows no evidence of active arterial extravasation  2) No intervention performed  3) L CFA closed with Mynx
As per surgeon dictation.

## 2018-05-11 NOTE — BRIEF OPERATIVE NOTE - POST-OP DX
Blood loss anemia  05/11/2018  Possible pelvic source, necessitating IR angiography  Active  Timbo Quintanilla  Prostate CA  05/09/2018    Active  Mehnaz Rousseau

## 2018-05-11 NOTE — PROGRESS NOTE ADULT - SUBJECTIVE AND OBJECTIVE BOX
After discussing case with Dr. Victoria, a CT abdomen/ pelvis was ordered. Patient in NAD, offers no complaints.    CT Results c/w Hemoperitoneum, + extravasation of contrast in region of left external iliac artery.    Discussed CT results with Dr. Victoria.    RENATE output = 250ml x last 7 hours    vitals: BP= 120/74, HR= 88, RR= 26, Temp= 98.4, O2 sat= 98% (RA)    Plan:      NPO  IVF  am labs  consult IR in am  vital sign checks every 4 hours After discussing case with Dr. Victoria, a CT abdomen/ pelvis was ordered. Patient in NAD, offers no complaints.    CT Results c/w Hemoperitoneum, + extravasation of contrast in region of left external iliac artery.    Discussed CT results with Dr. Victoria.    RENATE output = 250ml x last 7 hours    Hemoglobin/ Hematocrit: 13.4/ 42 to 12.6/ 38.9    vitals: BP= 120/74, HR= 88, RR= 26, Temp= 98.4, O2 sat= 98% (RA)    Plan:      NPO  IVF  am labs  consult IR in am  vital sign checks every 4 hours

## 2018-05-11 NOTE — CHART NOTE - NSCHARTNOTEFT_GEN_A_CORE
Patient underwent pelvic angiography.   I was present and directly reviewed all images with Dr Wilkes  No evidence of active arterial bleeding  No surgical intervention necessary at this point  He is hemodynamically stable.    Plan:  1. H/H checks  2. Follow patient clinically  3.  Continue RENATE drainage

## 2018-05-11 NOTE — BRIEF OPERATIVE NOTE - PROCEDURE
<<-----Click on this checkbox to enter Procedure Angiography of pelvic vessels  05/11/2018    Active  FRANCO

## 2018-05-11 NOTE — PROGRESS NOTE ADULT - SUBJECTIVE AND OBJECTIVE BOX
Pt denies any pain.  Has been passing gas.  Feels like his stomach is a little distended.  No n/v.  Has been npo overnight.      Vital Signs Last 24 Hrs  T(F): 98.1   HR: 100  BP: 115/60  RR: 18   SpO2: 97%    Gen-appears comfortable  CV- RRR  Chest- CTA bilat  Abd- soft, distended, non-tender, Port site intact with dermabond,          R lower quadrant RENATE site dressing saturated, dressing taken down, site still actively leaking, new pressure dressing applied  Aguiar- Pt denies any pain.  Has been passing gas.  Feels like his stomach is a little distended.  No n/v.  Has been npo overnight.      Vital Signs Last 24 Hrs  T(F): 98.1   HR: 100  BP: 115/60  RR: 18   SpO2: 97%    Gen-appears comfortable  CV- RRR  Chest- CTA bilat  Abd- soft, distended, non-tender, Port site intact with dermabond,          R lower quadrant RENATE site dressing saturated, dressing taken down, site still actively leaking, new pressure dressing applied         RENATE- drained 200cc overnight, dark red  Aguiar- 2200 cc clear urine overnight    05-11    139  |  105  |  10  ----------------------------<  162<H>  4.2   |  27  |  0.82                           11.6   10.20 )-----------( 180                   36.1

## 2018-05-11 NOTE — CDI QUERY NOTE - NSCDIOTHERTXTBX_GEN_ALL_CORE_HH
Patient was admitted with HGB of 14.1, status post  prostatectomy, RENATE drain losses documented as over 500 mls and CT Results c/w Hemoperitoneum. The brief procedure note states “blood loss anemia.” Please further specify the acuity of the anemia in a chart note or addendum:  A.	Acute/postoperative blood loss anemia  B.	Acute on chronic blood loss anemia  C.	Other

## 2018-05-12 DIAGNOSIS — C61 MALIGNANT NEOPLASM OF PROSTATE: ICD-10-CM

## 2018-05-12 LAB
ANION GAP SERPL CALC-SCNC: 7 MMOL/L — SIGNIFICANT CHANGE UP (ref 5–17)
BUN SERPL-MCNC: 9 MG/DL — SIGNIFICANT CHANGE UP (ref 7–23)
CALCIUM SERPL-MCNC: 8.3 MG/DL — LOW (ref 8.5–10.1)
CHLORIDE SERPL-SCNC: 107 MMOL/L — SIGNIFICANT CHANGE UP (ref 96–108)
CO2 SERPL-SCNC: 26 MMOL/L — SIGNIFICANT CHANGE UP (ref 22–31)
CREAT SERPL-MCNC: 0.74 MG/DL — SIGNIFICANT CHANGE UP (ref 0.5–1.3)
GLUCOSE BLDC GLUCOMTR-MCNC: 106 MG/DL — HIGH (ref 70–99)
GLUCOSE BLDC GLUCOMTR-MCNC: 118 MG/DL — HIGH (ref 70–99)
GLUCOSE BLDC GLUCOMTR-MCNC: 141 MG/DL — HIGH (ref 70–99)
GLUCOSE BLDC GLUCOMTR-MCNC: 178 MG/DL — HIGH (ref 70–99)
GLUCOSE SERPL-MCNC: 99 MG/DL — SIGNIFICANT CHANGE UP (ref 70–99)
HCT VFR BLD CALC: 29.3 % — LOW (ref 39–50)
HCT VFR BLD CALC: 30.1 % — LOW (ref 39–50)
HCT VFR BLD CALC: 30.5 % — LOW (ref 39–50)
HGB BLD-MCNC: 10 G/DL — LOW (ref 13–17)
HGB BLD-MCNC: 9.7 G/DL — LOW (ref 13–17)
HGB BLD-MCNC: 9.9 G/DL — LOW (ref 13–17)
MCHC RBC-ENTMCNC: 26.1 PG — LOW (ref 27–34)
MCHC RBC-ENTMCNC: 32.9 GM/DL — SIGNIFICANT CHANGE UP (ref 32–36)
MCV RBC AUTO: 79.2 FL — LOW (ref 80–100)
NRBC # BLD: 0 /100 WBCS — SIGNIFICANT CHANGE UP (ref 0–0)
PLATELET # BLD AUTO: 155 K/UL — SIGNIFICANT CHANGE UP (ref 150–400)
POTASSIUM SERPL-MCNC: 4.4 MMOL/L — SIGNIFICANT CHANGE UP (ref 3.5–5.3)
POTASSIUM SERPL-SCNC: 4.4 MMOL/L — SIGNIFICANT CHANGE UP (ref 3.5–5.3)
RBC # BLD: 3.8 M/UL — LOW (ref 4.2–5.8)
RBC # FLD: 19.6 % — HIGH (ref 10.3–14.5)
SODIUM SERPL-SCNC: 140 MMOL/L — SIGNIFICANT CHANGE UP (ref 135–145)
WBC # BLD: 9.18 K/UL — SIGNIFICANT CHANGE UP (ref 3.8–10.5)
WBC # FLD AUTO: 9.18 K/UL — SIGNIFICANT CHANGE UP (ref 3.8–10.5)

## 2018-05-12 RX ORDER — SODIUM CHLORIDE 9 MG/ML
500 INJECTION INTRAMUSCULAR; INTRAVENOUS; SUBCUTANEOUS ONCE
Qty: 0 | Refills: 0 | Status: COMPLETED | OUTPATIENT
Start: 2018-05-12 | End: 2018-05-12

## 2018-05-12 RX ORDER — SODIUM CHLORIDE 9 MG/ML
250 INJECTION INTRAMUSCULAR; INTRAVENOUS; SUBCUTANEOUS ONCE
Qty: 0 | Refills: 0 | Status: COMPLETED | OUTPATIENT
Start: 2018-05-12 | End: 2018-05-12

## 2018-05-12 RX ORDER — SODIUM CHLORIDE 9 MG/ML
1000 INJECTION INTRAMUSCULAR; INTRAVENOUS; SUBCUTANEOUS
Qty: 0 | Refills: 0 | Status: DISCONTINUED | OUTPATIENT
Start: 2018-05-12 | End: 2018-05-14

## 2018-05-12 RX ADMIN — SENNA PLUS 2 TABLET(S): 8.6 TABLET ORAL at 21:16

## 2018-05-12 RX ADMIN — SODIUM CHLORIDE 100 MILLILITER(S): 9 INJECTION INTRAMUSCULAR; INTRAVENOUS; SUBCUTANEOUS at 01:43

## 2018-05-12 RX ADMIN — Medication 100 MILLIGRAM(S): at 21:11

## 2018-05-12 RX ADMIN — SODIUM CHLORIDE 1000 MILLILITER(S): 9 INJECTION INTRAMUSCULAR; INTRAVENOUS; SUBCUTANEOUS at 09:21

## 2018-05-12 RX ADMIN — Medication 650 MILLIGRAM(S): at 07:45

## 2018-05-12 RX ADMIN — SODIUM CHLORIDE 100 MILLILITER(S): 9 INJECTION INTRAMUSCULAR; INTRAVENOUS; SUBCUTANEOUS at 11:48

## 2018-05-12 RX ADMIN — SODIUM CHLORIDE 500 MILLILITER(S): 9 INJECTION INTRAMUSCULAR; INTRAVENOUS; SUBCUTANEOUS at 06:32

## 2018-05-12 RX ADMIN — Medication 650 MILLIGRAM(S): at 21:11

## 2018-05-12 RX ADMIN — Medication 100 MILLIGRAM(S): at 05:37

## 2018-05-12 RX ADMIN — Medication 100 MILLIGRAM(S): at 13:20

## 2018-05-12 RX ADMIN — Medication 1 TABLET(S): at 11:48

## 2018-05-12 NOTE — PROVIDER CONTACT NOTE (OTHER) - REASON
Hemoperitoneum, active CT contrast extravasation from external iliac artery.  POD # 2 s/p prostatectomy.

## 2018-05-12 NOTE — PROGRESS NOTE ADULT - ASSESSMENT
64y/o M s/p Robotic Prostatectomy POD#3   -Give 500cc more Bolus for Low UOP  -f/u Fluid challenge  - RENATE site Dressing minimal soiled, Changed  -H/H dec 9.9/30.1 from 10.7/32.6  - Rpt H/H @2PM  -F/U RENATE drain out put and dressing more frequently  -Strict I/Os  - Advance Diet to Full Liq and plan to advance as tolerated  Above Plan discussed with Dr. Victoria.

## 2018-05-12 NOTE — PROGRESS NOTE ADULT - SUBJECTIVE AND OBJECTIVE BOX
Status Post: Robotic Prostatectomy      Post Operative Day #: 3    SUBJECTIVE: No complaints. Overnight dressing changed x 1 and early AM 250cc NS bolus given for low UOP@25cc/hrs. Pt responding to bolus make another 50cc urine yellow colored.    Flatus: Y             Bowel Movement: N  Pain (0-10): 0-3           Pain Control Adequate: Y  Nausea: N          Vomiting: N  Diarrhea/Constipation?  N  Chest Pain: N   SOB:  N    MEDICATIONS  (STANDING):  calcium carbonate 1250 mG + Vitamin D (OsCal 500 + D) 1 Tablet(s) Oral daily  insulin lispro (HumaLOG) corrective regimen sliding scale   SubCutaneous three times a day before meals  senna 2 Tablet(s) Oral at bedtime  sodium chloride 0.9% Bolus 500 milliLiter(s) IV Bolus once  sodium chloride 0.9%. 1000 milliLiter(s) (100 mL/Hr) IV Continuous <Continuous>    MEDICATIONS  (PRN):  acetaminophen   Tablet 650 milliGRAM(s) Oral every 6 hours PRN pain or headache  dextrose 40% Gel 15 Gram(s) Oral once PRN Blood Glucose LESS THAN 70 milliGRAM(s)/deciliter  glucagon  Injectable 1 milliGRAM(s) IntraMuscular once PRN Glucose LESS THAN 70 milligrams/deciliter  morphine  - Injectable 2 milliGRAM(s) IV Push every 3 hours PRN Severe Pain (7 - 10)  ondansetron Injectable 4 milliGRAM(s) IV Push every 6 hours PRN Nausea and/or Vomiting  oxyCODONE    5 mG/acetaminophen 325 mG 2 Tablet(s) Oral every 6 hours PRN Severe Pain      OBJECTIVE:  Vital Signs Last 24 Hrs  T(C): 37.3 (12 May 2018 04:41), Max: 37.4 (11 May 2018 20:42)  T(F): 99.1 (12 May 2018 04:41), Max: 99.4 (11 May 2018 20:42)  HR: 88 (12 May 2018 04:41) (85 - 96)  BP: 118/81 (12 May 2018 04:41) (112/80 - 130/80)  BP(mean): 82 (11 May 2018 09:50) (82 - 82)  RR: 17 (12 May 2018 04:41) (17 - 27)  SpO2: 100% (12 May 2018 04:41) (98% - 100%)  I&O's Detail    11 May 2018 07:01  -  12 May 2018 07:00  --------------------------------------------------------  IN:    Other: 500 mL    sodium chloride 0.9%: 300 mL  Total IN: 800 mL    OUT:    Bulb: 435 mL/12H    Indwelling Catheter - Urethral: 300 mL over 9 hrs     CBC Full  -  ( 12 May 2018 06:28 )  WBC Count : 9.18 K/uL  Hemoglobin : 9.9 g/dL  Hematocrit : 30.1 %  Platelet Count - Automated : 155 K/uL  Mean Cell Volume : 79.2 fl  Mean Cell Hemoglobin : 26.1 pg  Mean Cell Hemoglobin Concentration : 32.9 gm/dL  CBC Full  -  ( 11 May 2018 20:18 )  WBC Count : x  Hemoglobin : 10.7 g/dL  Hematocrit : 32.6 %  CBC Full  -  ( 11 May 2018 12:28 )  WBC Count : 14.03 K/uL  Hemoglobin : 11.6 g/dL  Hematocrit : 36.0 %  Platelet Count - Automated : 160 K/uL  Mean Cell Volume : 81.6 fl  Mean Cell Hemoglobin : 26.3 pg  Mean Cell Hemoglobin Concentration : 32.2 gm/dL      12 May 2018 06:28    140    |  107    |  9      ----------------------------<  99     4.4     |  26     |  0.74   11 May 2018 05:38    139    |  105    |  10     ----------------------------<  162    4.2     |  27     |  0.82   10 May 2018 05:25    140    |  106    |  10     ----------------------------<  97     3.8     |  26     |  0.92     Ca    8.3        12 May 2018 06:28  Ca    8.5        11 May 2018 05:38  Ca    8.5        10 May 2018 05:25      PHYSICAL EXAM:    Abd: soft, NT, mild distended, +BS  Incision site C/D/I except RENATE drain site still oozing  Ext: no edema
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
## 2018-05-13 LAB
ANION GAP SERPL CALC-SCNC: 8 MMOL/L — SIGNIFICANT CHANGE UP (ref 5–17)
BUN SERPL-MCNC: 6 MG/DL — LOW (ref 7–23)
CALCIUM SERPL-MCNC: 8.3 MG/DL — LOW (ref 8.5–10.1)
CHLORIDE SERPL-SCNC: 107 MMOL/L — SIGNIFICANT CHANGE UP (ref 96–108)
CO2 SERPL-SCNC: 24 MMOL/L — SIGNIFICANT CHANGE UP (ref 22–31)
CREAT SERPL-MCNC: 0.61 MG/DL — SIGNIFICANT CHANGE UP (ref 0.5–1.3)
GLUCOSE BLDC GLUCOMTR-MCNC: 110 MG/DL — HIGH (ref 70–99)
GLUCOSE BLDC GLUCOMTR-MCNC: 118 MG/DL — HIGH (ref 70–99)
GLUCOSE BLDC GLUCOMTR-MCNC: 118 MG/DL — HIGH (ref 70–99)
GLUCOSE BLDC GLUCOMTR-MCNC: 123 MG/DL — HIGH (ref 70–99)
GLUCOSE SERPL-MCNC: 110 MG/DL — HIGH (ref 70–99)
HCT VFR BLD CALC: 28.8 % — LOW (ref 39–50)
HCT VFR BLD CALC: 28.8 % — LOW (ref 39–50)
HGB BLD-MCNC: 9.5 G/DL — LOW (ref 13–17)
HGB BLD-MCNC: 9.6 G/DL — LOW (ref 13–17)
MCHC RBC-ENTMCNC: 26.7 PG — LOW (ref 27–34)
MCHC RBC-ENTMCNC: 33.3 GM/DL — SIGNIFICANT CHANGE UP (ref 32–36)
MCV RBC AUTO: 80 FL — SIGNIFICANT CHANGE UP (ref 80–100)
NRBC # BLD: 0 /100 WBCS — SIGNIFICANT CHANGE UP (ref 0–0)
PLATELET # BLD AUTO: 169 K/UL — SIGNIFICANT CHANGE UP (ref 150–400)
POTASSIUM SERPL-MCNC: 3.8 MMOL/L — SIGNIFICANT CHANGE UP (ref 3.5–5.3)
POTASSIUM SERPL-SCNC: 3.8 MMOL/L — SIGNIFICANT CHANGE UP (ref 3.5–5.3)
RBC # BLD: 3.6 M/UL — LOW (ref 4.2–5.8)
RBC # FLD: 18.8 % — HIGH (ref 10.3–14.5)
SODIUM SERPL-SCNC: 139 MMOL/L — SIGNIFICANT CHANGE UP (ref 135–145)
WBC # BLD: 8.01 K/UL — SIGNIFICANT CHANGE UP (ref 3.8–10.5)
WBC # FLD AUTO: 8.01 K/UL — SIGNIFICANT CHANGE UP (ref 3.8–10.5)

## 2018-05-13 RX ADMIN — Medication 100 MILLIGRAM(S): at 21:11

## 2018-05-13 RX ADMIN — Medication 1 TABLET(S): at 13:20

## 2018-05-13 RX ADMIN — Medication 100 MILLIGRAM(S): at 05:51

## 2018-05-13 RX ADMIN — SODIUM CHLORIDE 100 MILLILITER(S): 9 INJECTION INTRAMUSCULAR; INTRAVENOUS; SUBCUTANEOUS at 12:49

## 2018-05-13 RX ADMIN — SODIUM CHLORIDE 100 MILLILITER(S): 9 INJECTION INTRAMUSCULAR; INTRAVENOUS; SUBCUTANEOUS at 23:48

## 2018-05-13 RX ADMIN — SENNA PLUS 2 TABLET(S): 8.6 TABLET ORAL at 21:11

## 2018-05-13 RX ADMIN — Medication 650 MILLIGRAM(S): at 13:20

## 2018-05-13 RX ADMIN — Medication 100 MILLIGRAM(S): at 13:20

## 2018-05-13 RX ADMIN — SODIUM CHLORIDE 100 MILLILITER(S): 9 INJECTION INTRAMUSCULAR; INTRAVENOUS; SUBCUTANEOUS at 00:37

## 2018-05-13 NOTE — PROGRESS NOTE ADULT - SUBJECTIVE AND OBJECTIVE BOX
Status Post: Robotic Prostatectomy      Post Operative Day #: 4    SUBJECTIVE: No complaints. Overnight dressing changed x 1. Dressing Clear/dry/intact.     Flatus: Y             Bowel Movement: N  Pain (0-10): 0-3           Pain Control Adequate: Y  Nausea: N          Vomiting: N  Diarrhea/Constipation?  N  Chest Pain: N   SOB:  N    OBJECTIVE:  Vital Signs Last 24 Hrs  T(C): 36.8 (13 May 2018 04:58), Max: 36.9 (12 May 2018 20:21)  T(F): 98.2 (13 May 2018 04:58), Max: 98.5 (12 May 2018 20:21)  HR: 93 (13 May 2018 04:58) (77 - 93)  BP: 122/80 (13 May 2018 04:58) (114/65 - 145/79)  BP(mean): --  RR: 18 (13 May 2018 04:58) (16 - 18)  SpO2: 99% (13 May 2018 04:58) (95% - 100%)  I&O's Detail    12 May 2018 07:01  -  13 May 2018 07:00  --------------------------------------------------------  IN:    Sodium Chloride 0.9% IV Bolus: 500 mL    sodium chloride 0.9%.: 1000 mL  Total IN: 1500 mL    OUT:    Bulb: 390 mL    Indwelling Catheter - Urethral: 1500 mL  Total OUT: 1890 mL    Total NET: -390 mL      13 May 2018 07:01  -  13 May 2018 11:14  --------------------------------------------------------  IN:  Total IN: 0 mL    OUT:    Bulb: 50 mL  Total OUT: 50 mL    Total NET: -50 mL      Labs:    CBC Full  -  ( 13 May 2018 07:04 )  WBC Count : 8.01 K/uL  Hemoglobin : 9.6 g/dL  Hematocrit : 28.8 %  Platelet Count - Automated : 169 K/uL  Mean Cell Volume : 80.0 fl  Mean Cell Hemoglobin : 26.7 pg  Mean Cell Hemoglobin Concentration : 33.3 gm/dL    CBC Full  -  ( 13 May 2018 02:05 )  WBC Count : x  Hemoglobin : 9.5 g/dL  Hematocrit : 28.8 %    CBC Full  -  ( 12 May 2018 19:50 )  WBC Count : x  Hemoglobin : 9.7 g/dL  Hematocrit : 29.3 %    13 May 2018 07:04    139    |  107    |  6      ----------------------------<  110    3.8     |  24     |  0.61   12 May 2018 06:28    140    |  107    |  9      ----------------------------<  99     4.4     |  26     |  0.74   11 May 2018 05:38    139    |  105    |  10     ----------------------------<  162    4.2     |  27     |  0.82     Ca    8.3        13 May 2018 07:04  Ca    8.3        12 May 2018 06:28  Ca    8.5        11 May 2018 05:38      PHYSICAL EXAM:    Abd: soft, NT, mild distended, +BS  Incision site C/D/I, RENATE site dry.  Ext: no edema

## 2018-05-13 NOTE — PROGRESS NOTE ADULT - ASSESSMENT
A/P:  64y/o M s/p Robotic Prostatectomy POD#4  -H/H stable last 3 labs  -F/U labs daily  -Strict I/Os  - Advance Diet to reg  Aove Plan discussed with Dr. Victoria.
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
## 2018-05-14 LAB
ANION GAP SERPL CALC-SCNC: 8 MMOL/L — SIGNIFICANT CHANGE UP (ref 5–17)
BUN SERPL-MCNC: 6 MG/DL — LOW (ref 7–23)
CALCIUM SERPL-MCNC: 8.5 MG/DL — SIGNIFICANT CHANGE UP (ref 8.5–10.1)
CHLORIDE SERPL-SCNC: 107 MMOL/L — SIGNIFICANT CHANGE UP (ref 96–108)
CO2 SERPL-SCNC: 23 MMOL/L — SIGNIFICANT CHANGE UP (ref 22–31)
CREAT SERPL-MCNC: 0.66 MG/DL — SIGNIFICANT CHANGE UP (ref 0.5–1.3)
GLUCOSE BLDC GLUCOMTR-MCNC: 101 MG/DL — HIGH (ref 70–99)
GLUCOSE BLDC GLUCOMTR-MCNC: 134 MG/DL — HIGH (ref 70–99)
GLUCOSE BLDC GLUCOMTR-MCNC: 97 MG/DL — SIGNIFICANT CHANGE UP (ref 70–99)
GLUCOSE SERPL-MCNC: 93 MG/DL — SIGNIFICANT CHANGE UP (ref 70–99)
HCT VFR BLD CALC: 29.1 % — LOW (ref 39–50)
HGB BLD-MCNC: 9.5 G/DL — LOW (ref 13–17)
MCHC RBC-ENTMCNC: 26 PG — LOW (ref 27–34)
MCHC RBC-ENTMCNC: 32.6 GM/DL — SIGNIFICANT CHANGE UP (ref 32–36)
MCV RBC AUTO: 79.5 FL — LOW (ref 80–100)
NRBC # BLD: 0 /100 WBCS — SIGNIFICANT CHANGE UP (ref 0–0)
PLATELET # BLD AUTO: 208 K/UL — SIGNIFICANT CHANGE UP (ref 150–400)
POTASSIUM SERPL-MCNC: 4.2 MMOL/L — SIGNIFICANT CHANGE UP (ref 3.5–5.3)
POTASSIUM SERPL-SCNC: 4.2 MMOL/L — SIGNIFICANT CHANGE UP (ref 3.5–5.3)
RBC # BLD: 3.66 M/UL — LOW (ref 4.2–5.8)
RBC # FLD: 18.7 % — HIGH (ref 10.3–14.5)
SODIUM SERPL-SCNC: 138 MMOL/L — SIGNIFICANT CHANGE UP (ref 135–145)
WBC # BLD: 7.57 K/UL — SIGNIFICANT CHANGE UP (ref 3.8–10.5)
WBC # FLD AUTO: 7.57 K/UL — SIGNIFICANT CHANGE UP (ref 3.8–10.5)

## 2018-05-14 RX ADMIN — Medication 100 MILLIGRAM(S): at 21:01

## 2018-05-14 RX ADMIN — Medication 100 MILLIGRAM(S): at 04:53

## 2018-05-14 RX ADMIN — Medication 650 MILLIGRAM(S): at 12:42

## 2018-05-14 RX ADMIN — SENNA PLUS 2 TABLET(S): 8.6 TABLET ORAL at 21:01

## 2018-05-14 RX ADMIN — Medication 100 MILLIGRAM(S): at 13:02

## 2018-05-14 RX ADMIN — Medication 1 TABLET(S): at 12:42

## 2018-05-14 NOTE — PROGRESS NOTE ADULT - PROBLEM SELECTOR PLAN 1
All seems very well, with stable Hgb.  Dr. Mcclain to return tomorrow
All seems very well. Dr. Victoria to take over care at this time.
The Hgb went from 10.7 to 9.9 from yesterday.  The patient is asx.  The labs weill be checked q 6h and I asked to be informed when and if it reaches 8.

## 2018-05-14 NOTE — PROGRESS NOTE ADULT - SUBJECTIVE AND OBJECTIVE BOX
Status Post:  Robotic Prostatectomy      Post Operative Day #: 5    SUBJECTIVE: No complaints. Tolerating reg diet. Overnight dressing changed x 1. This AM dressing found soaked with blood.     Flatus: Y             Bowel Movement: Y  Pain (0-10): 0-2           Pain Control Adequate: Y  Nausea: N          Vomiting: N  Diarrhea/Constipation?  N  Chest Pain: N   SOB:  N    OBJECTIVE:  Vital Signs Last 24 Hrs  T(C): 36.9 (14 May 2018 04:47), Max: 37.2 (13 May 2018 12:55)  T(F): 98.5 (14 May 2018 04:47), Max: 98.9 (13 May 2018 12:55)  HR: 92 (14 May 2018 04:47) (82 - 92)  BP: 128/87 (14 May 2018 04:47) (113/62 - 128/87)  BP(mean): --  RR: 20 (14 May 2018 04:47) (16 - 20)  SpO2: 98% (14 May 2018 04:47) (98% - 100%)  I&O's Detail    13 May 2018 07:01  -  14 May 2018 07:00  --------------------------------------------------------  IN:    sodium chloride 0.9%.: 5328 mL  Total IN: 5328 mL    OUT:    Bulb: 110 mL/12H    Indwelling Catheter - Urethral: 2800 mL/24H      CBC Full  -  ( 14 May 2018 06:00 )  WBC Count : 7.57 K/uL  Hemoglobin : 9.5 g/dL  Hematocrit : 29.1 %  CBC Full  -  ( 13 May 2018 07:04 )  WBC Count : 8.01 K/uL  Hemoglobin : 9.6 g/dL  Hematocrit : 28.8 %  CBC Full  -  ( 13 May 2018 02:05 )  Hemoglobin : 9.5 g/dL  Hematocrit : 28.8 %    14 May 2018 06:00    138    |  107    |  6      ----------------------------<  93     4.2     |  23     |  0.66   13 May 2018 07:04    139    |  107    |  6      ----------------------------<  110    3.8     |  24     |  0.61   12 May 2018 06:28    140    |  107    |  9      ----------------------------<  99     4.4     |  26     |  0.74     Ca    8.5        14 May 2018 06:00  Ca    8.3        13 May 2018 07:04  Ca    8.3        12 May 2018 06:28        PHYSICAL EXAM:    Abd: soft, NT, ND, +BS  Incision site C/D/I, RENATE site soiled with blood.  Ext: no edema    64y/o M s/p Robotic Prostatectomy POD#5  -H/H stable last 3 labs  -F/U labs daily  -Strict I/Os  -F/U RENATE out put may d/c prior d/c home  Will discussed with Dr. Victoria for D/C home planning.      Musculoskeletal:    Psychiatric:        Assessment/Plan: Status Post:  Robotic Prostatectomy      Post Operative Day #: 5    SUBJECTIVE: No complaints. Tolerating reg diet. Overnight dressing changed x 1. This AM dressing found soaked with blood.     Flatus: Y             Bowel Movement: Y  Pain (0-10): 0-2           Pain Control Adequate: Y  Nausea: N          Vomiting: N  Diarrhea/Constipation?  N  Chest Pain: N   SOB:  N    OBJECTIVE:  Vital Signs Last 24 Hrs  T(C): 36.9 (14 May 2018 04:47), Max: 37.2 (13 May 2018 12:55)  T(F): 98.5 (14 May 2018 04:47), Max: 98.9 (13 May 2018 12:55)  HR: 92 (14 May 2018 04:47) (82 - 92)  BP: 128/87 (14 May 2018 04:47) (113/62 - 128/87)  BP(mean): --  RR: 20 (14 May 2018 04:47) (16 - 20)  SpO2: 98% (14 May 2018 04:47) (98% - 100%)  I&O's Detail    13 May 2018 07:01  -  14 May 2018 07:00  --------------------------------------------------------  IN:    sodium chloride 0.9%.: 5328 mL  Total IN: 5328 mL    OUT:    Bulb: 110 mL/12H    Indwelling Catheter - Urethral: 2800 mL/24H      CBC Full  -  ( 14 May 2018 06:00 )  WBC Count : 7.57 K/uL  Hemoglobin : 9.5 g/dL  Hematocrit : 29.1 %  CBC Full  -  ( 13 May 2018 07:04 )  WBC Count : 8.01 K/uL  Hemoglobin : 9.6 g/dL  Hematocrit : 28.8 %  CBC Full  -  ( 13 May 2018 02:05 )  Hemoglobin : 9.5 g/dL  Hematocrit : 28.8 %    14 May 2018 06:00    138    |  107    |  6      ----------------------------<  93     4.2     |  23     |  0.66   13 May 2018 07:04    139    |  107    |  6      ----------------------------<  110    3.8     |  24     |  0.61   12 May 2018 06:28    140    |  107    |  9      ----------------------------<  99     4.4     |  26     |  0.74     Ca    8.5        14 May 2018 06:00  Ca    8.3        13 May 2018 07:04  Ca    8.3        12 May 2018 06:28        PHYSICAL EXAM:    Abd: soft, NT, ND, +BS  Incision site C/D/I, RENATE site soiled with blood.  Ext: no edema    62y/o M s/p Robotic Prostatectomy POD#5  -H/H stable last 3 labs  -F/U labs daily  -Strict I/Os  -F/U RENATE out put may d/c prior d/c home  Will discussed with Dr. Victoria for D/C home planning.
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
## 2018-05-15 LAB
ANION GAP SERPL CALC-SCNC: 9 MMOL/L — SIGNIFICANT CHANGE UP (ref 5–17)
BUN SERPL-MCNC: 5 MG/DL — LOW (ref 7–23)
CALCIUM SERPL-MCNC: 8.5 MG/DL — SIGNIFICANT CHANGE UP (ref 8.5–10.1)
CHLORIDE SERPL-SCNC: 103 MMOL/L — SIGNIFICANT CHANGE UP (ref 96–108)
CO2 SERPL-SCNC: 26 MMOL/L — SIGNIFICANT CHANGE UP (ref 22–31)
COMPN STONE: SIGNIFICANT CHANGE UP
CREAT SERPL-MCNC: 0.71 MG/DL — SIGNIFICANT CHANGE UP (ref 0.5–1.3)
GLUCOSE BLDC GLUCOMTR-MCNC: 100 MG/DL — HIGH (ref 70–99)
GLUCOSE BLDC GLUCOMTR-MCNC: 114 MG/DL — HIGH (ref 70–99)
GLUCOSE BLDC GLUCOMTR-MCNC: 118 MG/DL — HIGH (ref 70–99)
GLUCOSE SERPL-MCNC: 107 MG/DL — HIGH (ref 70–99)
HCT VFR BLD CALC: 30.3 % — LOW (ref 39–50)
HGB BLD-MCNC: 10 G/DL — LOW (ref 13–17)
MCHC RBC-ENTMCNC: 26.2 PG — LOW (ref 27–34)
MCHC RBC-ENTMCNC: 33 GM/DL — SIGNIFICANT CHANGE UP (ref 32–36)
MCV RBC AUTO: 79.3 FL — LOW (ref 80–100)
NRBC # BLD: 0 /100 WBCS — SIGNIFICANT CHANGE UP (ref 0–0)
PLATELET # BLD AUTO: 248 K/UL — SIGNIFICANT CHANGE UP (ref 150–400)
POTASSIUM SERPL-MCNC: 4 MMOL/L — SIGNIFICANT CHANGE UP (ref 3.5–5.3)
POTASSIUM SERPL-SCNC: 4 MMOL/L — SIGNIFICANT CHANGE UP (ref 3.5–5.3)
RBC # BLD: 3.82 M/UL — LOW (ref 4.2–5.8)
RBC # FLD: 18.3 % — HIGH (ref 10.3–14.5)
SODIUM SERPL-SCNC: 138 MMOL/L — SIGNIFICANT CHANGE UP (ref 135–145)
WBC # BLD: 7.84 K/UL — SIGNIFICANT CHANGE UP (ref 3.8–10.5)
WBC # FLD AUTO: 7.84 K/UL — SIGNIFICANT CHANGE UP (ref 3.8–10.5)

## 2018-05-15 RX ADMIN — Medication 100 MILLIGRAM(S): at 13:27

## 2018-05-15 RX ADMIN — Medication 100 MILLIGRAM(S): at 21:02

## 2018-05-15 RX ADMIN — Medication 1 TABLET(S): at 12:02

## 2018-05-15 RX ADMIN — Medication 100 MILLIGRAM(S): at 05:26

## 2018-05-15 RX ADMIN — SENNA PLUS 2 TABLET(S): 8.6 TABLET ORAL at 21:02

## 2018-05-15 NOTE — PROVIDER CONTACT NOTE (OTHER) - ACTION/TREATMENT ORDERED:
ABY Mendez made aware.  Continue to monitor, no further orders at this time.
Surgical PA, assessed changed dressing, will continue to monitor. H&H stable.

## 2018-05-15 NOTE — PROGRESS NOTE ADULT - SUBJECTIVE AND OBJECTIVE BOX
Status Post:  Robotic Prostatectomy    Post Operative Day #: 7    SUBJECTIVE:    Flatus: Y             Bowel Movement: Y  Pain (0-10): 1           Pain Control Adequate: Y  Nausea: N          Vomiting: N  Diarrhea/Constipation?  N  Chest Pain: N   SOB:  N    Called to evaluate patient's right sided RENATE drain dressing-- saturated with blood.    MEDICATIONS  (STANDING):  calcium carbonate 1250 mG + Vitamin D (OsCal 500 + D) 1 Tablet(s) Oral daily  dextrose 5%. 1000 milliLiter(s) (50 mL/Hr) IV Continuous <Continuous>  dextrose 50% Injectable 12.5 Gram(s) IV Push once  dextrose 50% Injectable 25 Gram(s) IV Push once  dextrose 50% Injectable 25 Gram(s) IV Push once  docusate sodium 100 milliGRAM(s) Oral three times a day  insulin lispro (HumaLOG) corrective regimen sliding scale   SubCutaneous three times a day before meals  senna 2 Tablet(s) Oral at bedtime    MEDICATIONS  (PRN):  acetaminophen   Tablet 650 milliGRAM(s) Oral every 6 hours PRN pain or headache  dextrose 40% Gel 15 Gram(s) Oral once PRN Blood Glucose LESS THAN 70 milliGRAM(s)/deciliter  glucagon  Injectable 1 milliGRAM(s) IntraMuscular once PRN Glucose LESS THAN 70 milligrams/deciliter  morphine  - Injectable 2 milliGRAM(s) IV Push every 3 hours PRN Severe Pain (7 - 10)  ondansetron Injectable 4 milliGRAM(s) IV Push every 6 hours PRN Nausea and/or Vomiting  oxyCODONE    5 mG/acetaminophen 325 mG 2 Tablet(s) Oral every 6 hours PRN Severe Pain      OBJECTIVE:  Vital Signs Last 24 Hrs  T(C): 36.7 (15 May 2018 04:47), Max: 37.2 (14 May 2018 20:13)  T(F): 98.1 (15 May 2018 04:47), Max: 98.9 (14 May 2018 20:13)  HR: 81 (15 May 2018 04:47) (80 - 81)  BP: 120/71 (15 May 2018 04:47) (120/71 - 134/84)  BP(mean): --  RR: 18 (15 May 2018 04:47) (17 - 18)  SpO2: 98% (15 May 2018 04:47) (95% - 100%)  I&O's Detail    14 May 2018 07:01  -  15 May 2018 07:00  --------------------------------------------------------  IN:    Oral Fluid: 120 mL    sodium chloride 0.9%: 1500 mL  Total IN: 1620 mL    OUT:    Bulb: 490 mL    Indwelling Catheter - Urethral: 2850 mL  Total OUT: 3340 mL    Total NET: -1720 mL          CBC Full  -  ( 15 May 2018 05:24 )  WBC Count : 7.84 K/uL  Hemoglobin : 10.0 g/dL  Hematocrit : 30.3 %  Platelet Count - Automated : 248 K/uL    CBC Full  -  ( 14 May 2018 06:00 )  WBC Count : 7.57 K/uL  Hemoglobin : 9.5 g/dL  Hematocrit : 29.1 %  Platelet Count - Automated : 208 K/uL    CBC Full  -  ( 13 May 2018 07:04 )  WBC Count : 8.01 K/uL  Hemoglobin : 9.6 g/dL  Hematocrit : 28.8 %  Platelet Count - Automated : 169 K/uL    15 May 2018 05:24    138    |  103    |  5      ----------------------------<  107    4.0     |  26     |  0.71   14 May 2018 06:00    138    |  107    |  6      ----------------------------<  93     4.2     |  23     |  0.66   13 May 2018 07:04    139    |  107    |  6      ----------------------------<  110    3.8     |  24     |  0.61     Ca    8.5        15 May 2018 05:24  Ca    8.5        14 May 2018 06:00  Ca    8.3        13 May 2018 07:04        PHYSICAL EXAM:      Constitutional: well developed    Respiratory: CTA B/L, no W/R/R    Cardiovascular: S1 and S2    Gastrointestinal: nondistended, + BSx 4, soft, NT, incisions C/D/I.  RENATE outputs sanguinous    Genitourinary: antonio in place    Extremities: no C/C/E    Neurological: A & O x 3    Skin: Warm, dry, intact    Psychiatric: Normal affect        Assessment/Plan:  POD # 7 s/p robotic prostatectomy with post operative bleeding  -- daily labs  -- monitor vitals  -- monitor H&H  -- continue present treatment  -- plan as per Dr. Victoria Status Post:  Robotic Prostatectomy    Post Operative Day #: 7    SUBJECTIVE:    Flatus: Y             Bowel Movement: Y  Pain (0-10): 1           Pain Control Adequate: Y  Nausea: N          Vomiting: N  Diarrhea/Constipation?  N  Chest Pain: N   SOB:  N    Called to evaluate patient's right sided RENATE drain dressing-- saturated with blood.    MEDICATIONS  (STANDING):  calcium carbonate 1250 mG + Vitamin D (OsCal 500 + D) 1 Tablet(s) Oral daily  dextrose 5%. 1000 milliLiter(s) (50 mL/Hr) IV Continuous <Continuous>  dextrose 50% Injectable 12.5 Gram(s) IV Push once  dextrose 50% Injectable 25 Gram(s) IV Push once  dextrose 50% Injectable 25 Gram(s) IV Push once  docusate sodium 100 milliGRAM(s) Oral three times a day  insulin lispro (HumaLOG) corrective regimen sliding scale   SubCutaneous three times a day before meals  senna 2 Tablet(s) Oral at bedtime    MEDICATIONS  (PRN):  acetaminophen   Tablet 650 milliGRAM(s) Oral every 6 hours PRN pain or headache  dextrose 40% Gel 15 Gram(s) Oral once PRN Blood Glucose LESS THAN 70 milliGRAM(s)/deciliter  glucagon  Injectable 1 milliGRAM(s) IntraMuscular once PRN Glucose LESS THAN 70 milligrams/deciliter  morphine  - Injectable 2 milliGRAM(s) IV Push every 3 hours PRN Severe Pain (7 - 10)  ondansetron Injectable 4 milliGRAM(s) IV Push every 6 hours PRN Nausea and/or Vomiting  oxyCODONE    5 mG/acetaminophen 325 mG 2 Tablet(s) Oral every 6 hours PRN Severe Pain      OBJECTIVE:  Vital Signs Last 24 Hrs  T(C): 36.7 (15 May 2018 04:47), Max: 37.2 (14 May 2018 20:13)  T(F): 98.1 (15 May 2018 04:47), Max: 98.9 (14 May 2018 20:13)  HR: 81 (15 May 2018 04:47) (80 - 81)  BP: 120/71 (15 May 2018 04:47) (120/71 - 134/84)  BP(mean): --  RR: 18 (15 May 2018 04:47) (17 - 18)  SpO2: 98% (15 May 2018 04:47) (95% - 100%)  I&O's Detail    14 May 2018 07:01  -  15 May 2018 07:00  --------------------------------------------------------  IN:    Oral Fluid: 120 mL    sodium chloride 0.9%: 1500 mL  Total IN: 1620 mL    OUT:    Bulb: 490 mL    Indwelling Catheter - Urethral: 2850 mL  Total OUT: 3340 mL    Total NET: -1720 mL          CBC Full  -  ( 15 May 2018 05:24 )  WBC Count : 7.84 K/uL  Hemoglobin : 10.0 g/dL  Hematocrit : 30.3 %  Platelet Count - Automated : 248 K/uL    CBC Full  -  ( 14 May 2018 06:00 )  WBC Count : 7.57 K/uL  Hemoglobin : 9.5 g/dL  Hematocrit : 29.1 %  Platelet Count - Automated : 208 K/uL    CBC Full  -  ( 13 May 2018 07:04 )  WBC Count : 8.01 K/uL  Hemoglobin : 9.6 g/dL  Hematocrit : 28.8 %  Platelet Count - Automated : 169 K/uL    15 May 2018 05:24    138    |  103    |  5      ----------------------------<  107    4.0     |  26     |  0.71   14 May 2018 06:00    138    |  107    |  6      ----------------------------<  93     4.2     |  23     |  0.66   13 May 2018 07:04    139    |  107    |  6      ----------------------------<  110    3.8     |  24     |  0.61     Ca    8.5        15 May 2018 05:24  Ca    8.5        14 May 2018 06:00  Ca    8.3        13 May 2018 07:04        PHYSICAL EXAM:      Constitutional: well developed    Respiratory: CTA B/L, no W/R/R    Cardiovascular: S1 and S2    Gastrointestinal: nondistended, + BSx 4, soft, NT, incisions C/D/I.  RENATE outputs sanguinous.  Dressing saturated and removed.  New DSD placed.    Genitourinary: antonio in place    Extremities: no C/C/E    Neurological: A & O x 3    Skin: Warm, dry, intact    Psychiatric: Normal affect        Assessment/Plan:  POD # 7 s/p robotic prostatectomy with post operative bleeding  -- daily labs  -- monitor vitals  -- monitor H&H  -- continue present treatment  -- plan as per Dr. Victoria

## 2018-05-16 ENCOUNTER — TRANSCRIPTION ENCOUNTER (OUTPATIENT)
Age: 63
End: 2018-05-16

## 2018-05-16 VITALS — WEIGHT: 129.85 LBS

## 2018-05-16 LAB
ANION GAP SERPL CALC-SCNC: 12 MMOL/L — SIGNIFICANT CHANGE UP (ref 5–17)
BUN SERPL-MCNC: 8 MG/DL — SIGNIFICANT CHANGE UP (ref 7–23)
CALCIUM SERPL-MCNC: 8.9 MG/DL — SIGNIFICANT CHANGE UP (ref 8.5–10.1)
CHLORIDE SERPL-SCNC: 101 MMOL/L — SIGNIFICANT CHANGE UP (ref 96–108)
CO2 SERPL-SCNC: 23 MMOL/L — SIGNIFICANT CHANGE UP (ref 22–31)
CREAT SERPL-MCNC: 0.73 MG/DL — SIGNIFICANT CHANGE UP (ref 0.5–1.3)
GLUCOSE BLDC GLUCOMTR-MCNC: 102 MG/DL — HIGH (ref 70–99)
GLUCOSE BLDC GLUCOMTR-MCNC: 109 MG/DL — HIGH (ref 70–99)
GLUCOSE BLDC GLUCOMTR-MCNC: 89 MG/DL — SIGNIFICANT CHANGE UP (ref 70–99)
GLUCOSE SERPL-MCNC: 87 MG/DL — SIGNIFICANT CHANGE UP (ref 70–99)
HCT VFR BLD CALC: 30.8 % — LOW (ref 39–50)
HGB BLD-MCNC: 10.2 G/DL — LOW (ref 13–17)
MCHC RBC-ENTMCNC: 25.8 PG — LOW (ref 27–34)
MCHC RBC-ENTMCNC: 33.1 GM/DL — SIGNIFICANT CHANGE UP (ref 32–36)
MCV RBC AUTO: 78 FL — LOW (ref 80–100)
NRBC # BLD: 0 /100 WBCS — SIGNIFICANT CHANGE UP (ref 0–0)
PLATELET # BLD AUTO: 279 K/UL — SIGNIFICANT CHANGE UP (ref 150–400)
POTASSIUM SERPL-MCNC: 3.8 MMOL/L — SIGNIFICANT CHANGE UP (ref 3.5–5.3)
POTASSIUM SERPL-SCNC: 3.8 MMOL/L — SIGNIFICANT CHANGE UP (ref 3.5–5.3)
RBC # BLD: 3.95 M/UL — LOW (ref 4.2–5.8)
RBC # FLD: 18 % — HIGH (ref 10.3–14.5)
SODIUM SERPL-SCNC: 136 MMOL/L — SIGNIFICANT CHANGE UP (ref 135–145)
SURGICAL PATHOLOGY FINAL REPORT - CH: SIGNIFICANT CHANGE UP
WBC # BLD: 9.37 K/UL — SIGNIFICANT CHANGE UP (ref 3.8–10.5)
WBC # FLD AUTO: 9.37 K/UL — SIGNIFICANT CHANGE UP (ref 3.8–10.5)

## 2018-05-16 RX ORDER — SENNA PLUS 8.6 MG/1
2 TABLET ORAL
Qty: 0 | Refills: 0 | COMMUNITY
Start: 2018-05-16

## 2018-05-16 RX ORDER — DOCUSATE SODIUM 100 MG
1 CAPSULE ORAL
Qty: 0 | Refills: 0 | COMMUNITY
Start: 2018-05-16

## 2018-05-16 RX ORDER — ACETAMINOPHEN 500 MG
2 TABLET ORAL
Qty: 0 | Refills: 0 | COMMUNITY
Start: 2018-05-16

## 2018-05-16 RX ADMIN — Medication 1 TABLET(S): at 11:21

## 2018-05-16 RX ADMIN — Medication 100 MILLIGRAM(S): at 13:33

## 2018-05-16 RX ADMIN — Medication 100 MILLIGRAM(S): at 05:19

## 2018-05-16 NOTE — PROGRESS NOTE ADULT - PROVIDER SPECIALTY LIST ADULT
Surgery
Urology

## 2018-05-16 NOTE — DIETITIAN INITIAL EVALUATION ADULT. - ENERGY NEEDS
Ht.      66"        Wt.    130#             21.0 BMI                  IBW    142#               Pt is at    92%  IBW

## 2018-05-16 NOTE — DISCHARGE NOTE ADULT - NS AS ACTIVITY OBS
Stairs allowed/Showering allowed/Do not drive or operate machinery/No Heavy lifting/straining/Do not make important decisions

## 2018-05-16 NOTE — DISCHARGE NOTE ADULT - PATIENT PORTAL LINK FT
You can access the [facility] Patient Portal, offered by [facility], by registering with the following website: [email or web address]

## 2018-05-16 NOTE — PROGRESS NOTE ADULT - SUBJECTIVE AND OBJECTIVE BOX
Pt seen at bedside without complaint, Pain well controlled, tolerating PO intake. Denies cp, Sob, Dizziness.  comfortable in bed      ICU Vital Signs Last 24 Hrs  T(C): 37.1 (16 May 2018 04:22), Max: 37.2 (15 May 2018 20:27)  T(F): 98.7 (16 May 2018 04:22), Max: 98.9 (15 May 2018 20:27)  HR: 95 (16 May 2018 04:22) (79 - 95)  BP: 102/80 (16 May 2018 04:22) (102/80 - 127/87)  RR: 17 (16 May 2018 04:22) (17 - 17)  SpO2: 99% (16 May 2018 04:22) (99% - 100%)  Head:N/C A/T  Heart:RRR no M/R/G  Abdomen: Soft, Non distended, minimal TTP at surgical sites  Port sites W/dermabond C/D/I, Right flank echymosis noted, RENATE drain RLQ RENATE dressing changed.     Lalit Vásquez draining serous/sanguinous 15cc in bulb/ 165 Shift/ 395 24Hrs  Aguiar draining jeferson urine /1325cc                     10.2   9.37  )-----------( 279      ( 16 May 2018 05:34 )             30.8     05-16    136  |  101  |  8   ----------------------------<  87  3.8   |  23  |  0.73    Ca    8.9      16 May 2018 05:34

## 2018-05-16 NOTE — PROGRESS NOTE ADULT - ASSESSMENT
63 M POD #7 from Robotic Prostatectomy  stable.  1) Continue current Pain management.   2) OOB ambulate/ Incentive spirometry  3) Remove RENATE drain today.  4) Reg diet  5) Discharge planning  today with Aguiar catheter and follow up in office on Wednesday W/ Dr Victoria   As discussed with Dr Victoria [id number] 5/16/18

## 2018-05-16 NOTE — DIETITIAN INITIAL EVALUATION ADULT. - OTHER INFO
Nutrition Assessment for LOS: 63yoM admitted for malignant neoplasm of prostate, s/p prostatectomy (5/2). PMH includes T2DM (HgbA1c 6.1%). Pt smokes. No edema noted. Bandar 20. POD #8. Reports no change in appetite, eating well. Pt baseline is a fair po intake. Pt with increased energy needs secondary to CA. Based on diet recall pt not meeting ENN po. Follow low salt and low fat diet at home. No previous DM education, however not appropriate at this time. Encouraged pt to consume ONS between meals. No wt changes reported. BMI 21.0, pt appears thin. NFPE reveals severe muscle wasting to temporal, clavicle, acromion process. Moderate muscle wasting to calves, scapula. Modearte fat loss to ocular, ribs. Mild fat loss to triceps. Pt meets criteria for severe protein calorie malnutrition in context of chronic illness secondary to po intake meeting <75% ENN >1mo, severe muscle wasting, severe fat loss. Recommendations: 1) Liberalize diet to regular, no therapeutic diet restrictions indicated 2) Provide glucerna 8oz. 1x/day 3) Add MVI/minerals to ensure 100% RDI met. Will continue to monitor po intake, wt, labs. Nutrition Assessment for LOS: 63yoM admitted for malignant neoplasm of prostate, s/p prostatectomy (5/2). PMH includes T2DM (HgbA1c 6.1%). Pt smokes. No edema noted. Bandar 20. POD #8. Reports no change in appetite, eating well. Pt baseline is a fair po intake. Pt with increased energy needs secondary to CA. Based on diet recall pt not meeting ENN po. Follows low salt and low fat diet at home. No previous DM education, however not appropriate at this time. Encouraged pt to consume ONS between meals. No wt changes reported. BMI 21.0, pt appears thin. NFPE reveals severe muscle wasting to temporal, clavicle, acromion process. Moderate muscle wasting to calves, scapula. Modearte fat loss to ocular, ribs. Mild fat loss to triceps. Pt meets criteria for severe protein calorie malnutrition in context of chronic illness secondary to po intake meeting <75% ENN >1mo, severe muscle wasting, severe fat loss. Recommendations: 1) Liberalize diet to regular, no therapeutic diet restrictions indicated 2) Provide glucerna 8oz. 1x/day 3) Add MVI/minerals to ensure 100% RDI met. Will continue to monitor po intake, wt, labs. Nutrition Assessment for LOS: 63yoM admitted for malignant neoplasm of prostate, s/p prostatectomy (5/2). PMH includes T2DM (HgbA1c 6.1%). Pt smokes. No edema noted. Bandar 20. POD #8. Reports no change in appetite, eating well. Pt baseline is a fair po intake. Pt with increased energy needs secondary to CA. Based on diet recall pt not meeting ENN po. Follows low salt and low fat diet at home. No previous DM education, however not appropriate at this time. Encouraged pt to consume ONS between meals. No wt changes reported. BMI 21.0, pt appears thin. NFPE reveals severe muscle wasting to temporal, clavicle, acromion process. Moderate muscle wasting to calves, scapula. Moderate fat loss to ocular, ribs. Mild fat loss to triceps. Pt meets criteria for severe protein calorie malnutrition in context of chronic illness secondary to po intake meeting <75% ENN >1mo, severe muscle wasting, severe fat loss. Recommendations: 1) Liberalize diet to regular, no therapeutic diet restrictions indicated 2) Provide glucerna 8oz. 1x/day 3) Add MVI/minerals to ensure 100% RDI met. Will continue to monitor po intake, wt, labs.

## 2018-05-16 NOTE — DISCHARGE NOTE ADULT - MEDICATION SUMMARY - MEDICATIONS TO TAKE
I will START or STAY ON the medications listed below when I get home from the hospital:    acetaminophen 325 mg oral tablet  -- 2 tab(s) by mouth every 6 hours, As needed, pain or headache  -- Indication: For MORE EXTENSIVE SURGERY/MALIGNANT NEOPLASM OF PROSTATE    oxyCODONE-acetaminophen 5 mg-325 mg oral tablet  -- 1 tab(s) by mouth every 4 hours, As Needed -Severe Pain MDD:6  -- Indication: For MORE EXTENSIVE SURGERY/MALIGNANT NEOPLASM OF PROSTATE    Xigduo XR 10 mg-1000 mg oral tablet, extended release  -- 1 tab(s) by mouth once a day (in the morning)  -- Indication: For DM    ferrous gluconate 325 mg (36 mg elemental iron) oral tablet  -- 1 tab(s) by mouth once a day  -- Indication: For Anemia    docusate sodium 100 mg oral capsule  -- 1 cap(s) by mouth 3 times a day  -- Indication: For constipation    senna oral tablet  -- 2 tab(s) by mouth once a day (at bedtime)  -- Indication: For constipation    Calcium 500+D oral tablet, chewable  -- 1 tab(s) by mouth 2 times a day  -- Indication: For OP

## 2018-05-16 NOTE — CHART NOTE - NSCHARTNOTEFT_GEN_A_CORE
Upon Nutritional Assessment by the Registered Dietitian your patient was determined to meet criteria / has evidence of the following diagnosis/diagnoses:          [ ]  Mild Protein Calorie Malnutrition        [ ]  Moderate Protein Calorie Malnutrition        [x ] Severe Protein Calorie Malnutrition        [ ] Unspecified Protein Calorie Malnutrition        [ ] Underweight / BMI <19        [ ] Morbid Obesity / BMI > 40    Pt meets criteria for severe protein-calorie malnutrition in context of chronic illness 2/2  PO intake < 75% nutritional needs > one month  severe muscle wasting (temporal, clavicle, acromion process  moderate muscle wasting (calves, scapula)  moderate fat loss ocular, rib  mild  fat loss ( triceps)      Findings as based on:  •  Comprehensive nutrition assessment and consultation  •  Calorie counts (nutrient intake analysis)  •  Food acceptance and intake status from observations by staff  •  Follow up  •  Patient education  •  Intervention secondary to interdisciplinary rounds  •   concerns      Treatment:    The following diet has been recommended:  1. Liberalize diet to regular  2. provide Glucerna 1x day  3. add MVI w minerals       PROVIDER Section:     By signing this assessment you are acknowledging and agree with the diagnosis/diagnoses assigned by the Registered Dietitian    Comments:

## 2018-05-16 NOTE — DISCHARGE NOTE ADULT - HOSPITAL COURSE
Pt admitted  for robotic  Prostatectomy. Post operative course included Bladder spasms(POD 0) and increased sanguinous output of ANOPO drain revealing acute blood loss anemia , POD1  pt was evaluated w/ Ct A/P which revealed hemoperitoneum and was further evaluated with Angio with Interventional Radiology(no active bleed Identified). POD 2 Pt continued under close observation of anoop output  and cbc's were trended. ANOOP drains continued to have sanguinous output H+H were trended.  H+H found to be stable and pat was started on PO Diet. This morning pt doing well ANOOP decreased S/S output and was removed, Aguiar to remain in place and patient to be discharged home with follow up in office with Dr Russell Wednesday as outlined in discharge note.

## 2018-05-16 NOTE — DISCHARGE NOTE ADULT - CARE PLAN
Principal Discharge DX:	Prostate cancer  Goal:	Return to activities of daily living  Assessment and plan of treatment:	Discharge home with Aguiar catheter

## 2018-05-16 NOTE — DIETITIAN INITIAL EVALUATION ADULT. - NS AS NUTRI DX NUTRIENT
Malnutrition/Pt meets criteria for severe protein calorie malnutrition in context of chronic illness

## 2018-05-17 ENCOUNTER — EMERGENCY (EMERGENCY)
Facility: HOSPITAL | Age: 63
LOS: 0 days | Discharge: HOME | End: 2018-05-17
Attending: EMERGENCY MEDICINE | Admitting: EMERGENCY MEDICINE
Payer: COMMERCIAL

## 2018-05-17 VITALS
HEART RATE: 81 BPM | TEMPERATURE: 98 F | RESPIRATION RATE: 18 BRPM | DIASTOLIC BLOOD PRESSURE: 91 MMHG | WEIGHT: 154.98 LBS | OXYGEN SATURATION: 100 % | SYSTOLIC BLOOD PRESSURE: 108 MMHG

## 2018-05-17 VITALS
SYSTOLIC BLOOD PRESSURE: 128 MMHG | OXYGEN SATURATION: 100 % | DIASTOLIC BLOOD PRESSURE: 83 MMHG | HEART RATE: 72 BPM | TEMPERATURE: 98 F | RESPIRATION RATE: 18 BRPM

## 2018-05-17 DIAGNOSIS — F17.200 NICOTINE DEPENDENCE, UNSPECIFIED, UNCOMPLICATED: ICD-10-CM

## 2018-05-17 DIAGNOSIS — Z90.81 ACQUIRED ABSENCE OF SPLEEN: Chronic | ICD-10-CM

## 2018-05-17 DIAGNOSIS — Z98.890 OTHER SPECIFIED POSTPROCEDURAL STATES: Chronic | ICD-10-CM

## 2018-05-17 DIAGNOSIS — D64.9 ANEMIA, UNSPECIFIED: ICD-10-CM

## 2018-05-17 DIAGNOSIS — Z85.46 PERSONAL HISTORY OF MALIGNANT NEOPLASM OF PROSTATE: ICD-10-CM

## 2018-05-17 DIAGNOSIS — Z90.79 ACQUIRED ABSENCE OF OTHER GENITAL ORGAN(S): ICD-10-CM

## 2018-05-17 DIAGNOSIS — E11.9 TYPE 2 DIABETES MELLITUS WITHOUT COMPLICATIONS: ICD-10-CM

## 2018-05-17 DIAGNOSIS — T85.838A HEMORRHAGE DUE TO OTHER INTERNAL PROSTHETIC DEVICES, IMPLANTS AND GRAFTS, INITIAL ENCOUNTER: ICD-10-CM

## 2018-05-17 DIAGNOSIS — L76.32 POSTPROCEDURAL HEMATOMA OF SKIN AND SUBCUTANEOUS TISSUE FOLLOWING OTHER PROCEDURE: ICD-10-CM

## 2018-05-17 DIAGNOSIS — C61 MALIGNANT NEOPLASM OF PROSTATE: ICD-10-CM

## 2018-05-17 DIAGNOSIS — Z98.890 OTHER SPECIFIED POSTPROCEDURAL STATES: ICD-10-CM

## 2018-05-17 DIAGNOSIS — Z90.81 ACQUIRED ABSENCE OF SPLEEN: ICD-10-CM

## 2018-05-17 LAB
ALBUMIN SERPL ELPH-MCNC: 3.1 G/DL — LOW (ref 3.3–5)
ALP SERPL-CCNC: 52 U/L — SIGNIFICANT CHANGE UP (ref 40–120)
ALT FLD-CCNC: 18 U/L — SIGNIFICANT CHANGE UP (ref 12–78)
ANION GAP SERPL CALC-SCNC: 10 MMOL/L — SIGNIFICANT CHANGE UP (ref 5–17)
APTT BLD: 34.8 SEC — SIGNIFICANT CHANGE UP (ref 27.5–37.4)
AST SERPL-CCNC: 28 U/L — SIGNIFICANT CHANGE UP (ref 15–37)
BASOPHILS # BLD AUTO: 0.03 K/UL — SIGNIFICANT CHANGE UP (ref 0–0.2)
BASOPHILS NFR BLD AUTO: 0.3 % — SIGNIFICANT CHANGE UP (ref 0–2)
BILIRUB SERPL-MCNC: 0.9 MG/DL — SIGNIFICANT CHANGE UP (ref 0.2–1.2)
BLD GP AB SCN SERPL QL: SIGNIFICANT CHANGE UP
BUN SERPL-MCNC: 11 MG/DL — SIGNIFICANT CHANGE UP (ref 7–23)
CALCIUM SERPL-MCNC: 8.6 MG/DL — SIGNIFICANT CHANGE UP (ref 8.5–10.1)
CHLORIDE SERPL-SCNC: 101 MMOL/L — SIGNIFICANT CHANGE UP (ref 96–108)
CO2 SERPL-SCNC: 25 MMOL/L — SIGNIFICANT CHANGE UP (ref 22–31)
CREAT SERPL-MCNC: 0.75 MG/DL — SIGNIFICANT CHANGE UP (ref 0.5–1.3)
EOSINOPHIL # BLD AUTO: 0.12 K/UL — SIGNIFICANT CHANGE UP (ref 0–0.5)
EOSINOPHIL NFR BLD AUTO: 1.2 % — SIGNIFICANT CHANGE UP (ref 0–6)
GLUCOSE SERPL-MCNC: 111 MG/DL — HIGH (ref 70–99)
HCT VFR BLD CALC: 34 % — LOW (ref 39–50)
HGB BLD-MCNC: 11.4 G/DL — LOW (ref 13–17)
IMM GRANULOCYTES NFR BLD AUTO: 1.2 % — SIGNIFICANT CHANGE UP (ref 0–1.5)
INR BLD: 1.14 RATIO — SIGNIFICANT CHANGE UP (ref 0.88–1.16)
LYMPHOCYTES # BLD AUTO: 1.31 K/UL — SIGNIFICANT CHANGE UP (ref 1–3.3)
LYMPHOCYTES # BLD AUTO: 13.5 % — SIGNIFICANT CHANGE UP (ref 13–44)
MCHC RBC-ENTMCNC: 26.6 PG — LOW (ref 27–34)
MCHC RBC-ENTMCNC: 33.5 GM/DL — SIGNIFICANT CHANGE UP (ref 32–36)
MCV RBC AUTO: 79.4 FL — LOW (ref 80–100)
MONOCYTES # BLD AUTO: 1.22 K/UL — HIGH (ref 0–0.9)
MONOCYTES NFR BLD AUTO: 12.6 % — SIGNIFICANT CHANGE UP (ref 2–14)
NEUTROPHILS # BLD AUTO: 6.92 K/UL — SIGNIFICANT CHANGE UP (ref 1.8–7.4)
NEUTROPHILS NFR BLD AUTO: 71.2 % — SIGNIFICANT CHANGE UP (ref 43–77)
NRBC # BLD: 0 /100 WBCS — SIGNIFICANT CHANGE UP (ref 0–0)
PLATELET # BLD AUTO: 362 K/UL — SIGNIFICANT CHANGE UP (ref 150–400)
POTASSIUM SERPL-MCNC: 3.9 MMOL/L — SIGNIFICANT CHANGE UP (ref 3.5–5.3)
POTASSIUM SERPL-SCNC: 3.9 MMOL/L — SIGNIFICANT CHANGE UP (ref 3.5–5.3)
PROT SERPL-MCNC: 6.8 GM/DL — SIGNIFICANT CHANGE UP (ref 6–8.3)
PROTHROM AB SERPL-ACNC: 12.3 SEC — SIGNIFICANT CHANGE UP (ref 9.8–12.7)
RBC # BLD: 4.28 M/UL — SIGNIFICANT CHANGE UP (ref 4.2–5.8)
RBC # FLD: 18.1 % — HIGH (ref 10.3–14.5)
SODIUM SERPL-SCNC: 136 MMOL/L — SIGNIFICANT CHANGE UP (ref 135–145)
TYPE + AB SCN PNL BLD: SIGNIFICANT CHANGE UP
WBC # BLD: 9.72 K/UL — SIGNIFICANT CHANGE UP (ref 3.8–10.5)
WBC # FLD AUTO: 9.72 K/UL — SIGNIFICANT CHANGE UP (ref 3.8–10.5)

## 2018-05-17 PROCEDURE — 99285 EMERGENCY DEPT VISIT HI MDM: CPT

## 2018-05-17 PROCEDURE — 74177 CT ABD & PELVIS W/CONTRAST: CPT | Mod: 26

## 2018-05-17 NOTE — ED ADULT NURSE REASSESSMENT NOTE - NS ED NURSE REASSESS COMMENT FT1
patient positioned for comfort.  Warm blankets given.  Patient awaiting test results will continue to monitor.
Patient right wound dressing changed pressure dressing applied as per MD Vazquez's instructions.  Patient discharged home at this time. Wound care reviewed with patient and spouse.

## 2018-05-17 NOTE — ED STATDOCS - OBJECTIVE STATEMENT
62 y/o M w/ pmhx of prostate CA, anemia, DM, pshx s/p robotic prostatectomy, presents to ED to see Dr. Vazquez. Prostatectomy done last week d/c yesterday. Bleeding began today. Current smoker.  Urologist Dr. Victoria.

## 2018-05-17 NOTE — ED STATDOCS - PROGRESS NOTE DETAILS
Case d/w Dr. Vazquez.  He recommends repeating labs to assess for blood loss.  Will update him with results -Yasmin Aguilar PA-C CT and labs improving.  Patient feeling well besides drainage at incision site.  Dr. Vazquez aware of findings in workup.  Patient to be d/c home and follow up outpatient -Yasmin Aguilar PA-C

## 2018-05-17 NOTE — ED ADULT NURSE NOTE - OBJECTIVE STATEMENT
Pt with prostate hx c/o dysuria x 3 days.  Pt states he can only urinate " alittle at a time".  Pt denies burning with urination but states "I saw brown flakes in my urine".  Pt c/o flank pain.  Denies fever.

## 2018-05-17 NOTE — ED STATDOCS - ATTENDING CONTRIBUTION TO CARE
I, Chiki Bruno MD,  performed the initial face to face bedside interview with this patient regarding history of present illness, review of symptoms and relevant past medical, social and family history.  I completed an independent physical examination.  I was the initial provider who evaluated this patient. I have signed out the follow up of any pending tests (i.e. labs, radiological studies) to the ACP.  I have communicated the patient’s plan of care and disposition with the ACP.  The history, relevant review of systems, past medical and surgical history, medical decision making, and physical examination was documented by the scribe in my presence and I attest to the accuracy of the documentation.

## 2018-05-17 NOTE — ED STATDOCS - CARE PLAN
Principal Discharge DX:	Surgical site reaction, subsequent encounter  Secondary Diagnosis:	Post surgical complication
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
## 2018-05-17 NOTE — PROGRESS NOTE ADULT - PROBLEM SELECTOR PLAN 1
He can be discharged and should tape the dressings tightly.  He was given precautions regading when to call us.

## 2018-05-21 DIAGNOSIS — N21.0 CALCULUS IN BLADDER: ICD-10-CM

## 2018-05-21 DIAGNOSIS — E43 UNSPECIFIED SEVERE PROTEIN-CALORIE MALNUTRITION: ICD-10-CM

## 2018-05-21 DIAGNOSIS — E11.9 TYPE 2 DIABETES MELLITUS WITHOUT COMPLICATIONS: ICD-10-CM

## 2018-05-21 DIAGNOSIS — Z87.891 PERSONAL HISTORY OF NICOTINE DEPENDENCE: ICD-10-CM

## 2018-05-21 DIAGNOSIS — C61 MALIGNANT NEOPLASM OF PROSTATE: ICD-10-CM

## 2018-05-21 DIAGNOSIS — K66.1 HEMOPERITONEUM: ICD-10-CM

## 2018-05-21 DIAGNOSIS — D62 ACUTE POSTHEMORRHAGIC ANEMIA: ICD-10-CM

## 2018-05-23 ENCOUNTER — APPOINTMENT (OUTPATIENT)
Dept: UROLOGY | Facility: CLINIC | Age: 63
End: 2018-05-23
Payer: COMMERCIAL

## 2018-05-23 VITALS — TEMPERATURE: 97.9 F | SYSTOLIC BLOOD PRESSURE: 123 MMHG | DIASTOLIC BLOOD PRESSURE: 79 MMHG

## 2018-05-23 PROCEDURE — 99024 POSTOP FOLLOW-UP VISIT: CPT

## 2018-06-27 ENCOUNTER — APPOINTMENT (OUTPATIENT)
Dept: UROLOGY | Facility: CLINIC | Age: 63
End: 2018-06-27
Payer: COMMERCIAL

## 2018-06-27 VITALS — HEART RATE: 80 BPM | TEMPERATURE: 98.2 F | SYSTOLIC BLOOD PRESSURE: 122 MMHG | DIASTOLIC BLOOD PRESSURE: 79 MMHG

## 2018-06-27 PROCEDURE — 99024 POSTOP FOLLOW-UP VISIT: CPT

## 2018-08-01 ENCOUNTER — APPOINTMENT (OUTPATIENT)
Dept: UROLOGY | Facility: CLINIC | Age: 63
End: 2018-08-01
Payer: COMMERCIAL

## 2018-08-01 VITALS — HEART RATE: 83 BPM | SYSTOLIC BLOOD PRESSURE: 116 MMHG | DIASTOLIC BLOOD PRESSURE: 87 MMHG
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
## 2018-08-01 PROBLEM — E11.9 TYPE 2 DIABETES MELLITUS WITHOUT COMPLICATIONS: Chronic | Status: ACTIVE | Noted: 2018-05-02

## 2018-08-01 PROBLEM — Z78.9 OTHER SPECIFIED HEALTH STATUS: Chronic | Status: ACTIVE | Noted: 2018-05-02

## 2018-08-01 PROBLEM — F17.200 NICOTINE DEPENDENCE, UNSPECIFIED, UNCOMPLICATED: Chronic | Status: ACTIVE | Noted: 2018-05-02

## 2018-08-01 PROBLEM — D64.9 ANEMIA, UNSPECIFIED: Chronic | Status: ACTIVE | Noted: 2018-05-02

## 2018-08-01 PROBLEM — C61 MALIGNANT NEOPLASM OF PROSTATE: Chronic | Status: ACTIVE | Noted: 2018-05-02

## 2018-08-01 PROCEDURE — 99024 POSTOP FOLLOW-UP VISIT: CPT

## 2018-10-29 NOTE — H&P PST ADULT - TOBACCO USE
Current some day smoker Referred To Plastics For Closure Text (Leave Blank If You Do Not Want): After obtaining clear surgical margins the patient was sent to plastics for surgical repair.  The patient understands they will receive post-surgical care and follow-up from Dr. Martinez.

## 2018-12-05 ENCOUNTER — APPOINTMENT (OUTPATIENT)
Dept: UROLOGY | Facility: CLINIC | Age: 63
End: 2018-12-05
Payer: COMMERCIAL

## 2018-12-05 VITALS
BODY MASS INDEX: 22.82 KG/M2 | DIASTOLIC BLOOD PRESSURE: 89 MMHG | SYSTOLIC BLOOD PRESSURE: 143 MMHG | WEIGHT: 137 LBS | HEART RATE: 107 BPM | HEIGHT: 65 IN

## 2018-12-05 DIAGNOSIS — C61 MALIGNANT NEOPLASM OF PROSTATE: ICD-10-CM

## 2018-12-05 PROCEDURE — 99213 OFFICE O/P EST LOW 20 MIN: CPT

## 2018-12-06 LAB — PSA SERPL-MCNC: 0.01 NG/ML

## 2019-03-06 ENCOUNTER — APPOINTMENT (OUTPATIENT)
Dept: UROLOGY | Facility: CLINIC | Age: 64
End: 2019-03-06

## 2021-01-28 NOTE — PATIENT PROFILE ADULT. - PRESSURE ULCER(S)
[As Noted in HPI] : as noted in HPI [Facial Weakness] : no facial weakness [Arm Weakness] : arm weakness [Hand Weakness] :  hand weakness [Leg Weakness] : leg weakness [Poor Coordination] : good coordination [Difficulty Writing] : no difficulty writing [Difficulties in Speech] : no speech difficulties [Numbness] : no numbness [Tingling] : no tingling [Abnormal Sensation] : no abnormal sensation [Hypersensitivity] : no hypersensitivity [Inability to Walk] : able to walk no [Difficulty Walking] : difficulty walking [Ataxia] : no ataxia [Frequent Falls] : not falling [Limping] : not limping [Negative] : Constitutional

## 2021-02-18 NOTE — DISCHARGE NOTE ADULT - VISION (WITH CORRECTIVE LENSES IF THE PATIENT USUALLY WEARS THEM):
Impression: Other vitreous opacities, bilateral: H43.393 OU. Condition: established, stable. Plan: Discussed signs and symptoms of PVD/floaters. Patient instructed to call the office immediately if any symptoms noted. Normal vision: sees adequately in most situations; can see medication labels, newsprint

## 2021-12-07 NOTE — ED ADULT TRIAGE NOTE - MODE OF ARRIVAL
Please call Herber Olivares in 12 to 14 days to evaluate pain response to Bilateral TPI cervical paraspinal, trapezius, rhomboid performed by Mariya OLIVA on 12/7/2021.     Pre-procedure pain: 5   Walk in

## 2025-06-06 NOTE — DISCHARGE NOTE ADULT - CLICK TO LAUNCH ORM
Hobbs in 3 months   .
